# Patient Record
Sex: FEMALE | Race: BLACK OR AFRICAN AMERICAN | NOT HISPANIC OR LATINO | Employment: FULL TIME | URBAN - METROPOLITAN AREA
[De-identification: names, ages, dates, MRNs, and addresses within clinical notes are randomized per-mention and may not be internally consistent; named-entity substitution may affect disease eponyms.]

---

## 2017-03-02 ENCOUNTER — OFFICE VISIT (OUTPATIENT)
Dept: INTERNAL MEDICINE | Facility: CLINIC | Age: 27
End: 2017-03-02
Payer: COMMERCIAL

## 2017-03-02 VITALS
HEART RATE: 77 BPM | WEIGHT: 181.44 LBS | DIASTOLIC BLOOD PRESSURE: 66 MMHG | SYSTOLIC BLOOD PRESSURE: 120 MMHG | OXYGEN SATURATION: 99 % | RESPIRATION RATE: 16 BRPM | HEIGHT: 61 IN | BODY MASS INDEX: 34.26 KG/M2 | TEMPERATURE: 97 F

## 2017-03-02 DIAGNOSIS — J30.2 SEASONAL ALLERGIC RHINITIS, UNSPECIFIED ALLERGIC RHINITIS TRIGGER: ICD-10-CM

## 2017-03-02 DIAGNOSIS — E66.9 OBESITY (BMI 30.0-34.9): ICD-10-CM

## 2017-03-02 DIAGNOSIS — Z00.00 ROUTINE HEALTH MAINTENANCE: ICD-10-CM

## 2017-03-02 DIAGNOSIS — Z11.3 SCREEN FOR STD (SEXUALLY TRANSMITTED DISEASE): ICD-10-CM

## 2017-03-02 DIAGNOSIS — J40 BRONCHITIS: Primary | ICD-10-CM

## 2017-03-02 PROBLEM — J30.9 ALLERGIC RHINITIS: Status: ACTIVE | Noted: 2017-03-02

## 2017-03-02 PROCEDURE — 1160F RVW MEDS BY RX/DR IN RCRD: CPT | Mod: S$GLB,,, | Performed by: FAMILY MEDICINE

## 2017-03-02 PROCEDURE — 99999 PR PBB SHADOW E&M-NEW PATIENT-LVL III: CPT | Mod: PBBFAC,,, | Performed by: FAMILY MEDICINE

## 2017-03-02 PROCEDURE — 99203 OFFICE O/P NEW LOW 30 MIN: CPT | Mod: S$GLB,,, | Performed by: FAMILY MEDICINE

## 2017-03-02 RX ORDER — METHYLPREDNISOLONE 4 MG/1
TABLET ORAL
Qty: 1 PACKAGE | Refills: 0 | Status: SHIPPED | OUTPATIENT
Start: 2017-03-02 | End: 2017-03-23

## 2017-03-02 RX ORDER — LORATADINE 10 MG/1
TABLET ORAL
Refills: 0 | COMMUNITY
Start: 2017-02-13 | End: 2017-09-27

## 2017-03-02 RX ORDER — FLUTICASONE PROPIONATE AND SALMETEROL 100; 50 UG/1; UG/1
1 POWDER RESPIRATORY (INHALATION) 2 TIMES DAILY
COMMUNITY
End: 2017-03-02

## 2017-03-02 NOTE — MEDICAL/APP STUDENT
Subjective:       Patient ID: Ghada Oreilly is a 27 y.o. female.    Chief Complaint: Cough (times 3 weeks ) and Wheezing    HPI     26 yo AAF presenting alone for persistent cough.  She reports cough x ~ 3 weeks.  At times, she reports the cough is productive of green/white phlegm.  She reports chest pain with cough and some wheezing.  Her symptoms do seem to worsen at night when lying down.  She has been taking an old advair inhaler with no relief.  She denies n/v/d, runny nose, and fever.  Associated symptoms include intermittent headache. She denies trying OTC cough medications.  She is also looking to establish care with [facility] - previously she was treated by Dr. Aburto.      Health Maintenance:  Denies previous lipid screening.   Pap smear: 1/2017      Family History   Problem Relation Age of Onset    Hypertension Mother     Diabetes Mother     Thyroid disease Mother     No Known Problems Father        Current Outpatient Prescriptions:     fluticasone-salmeterol 100-50 mcg/dose (ADVAIR) 100-50 mcg/dose diskus inhaler, Inhale 1 puff into the lungs 2 (two) times daily. Controller, Disp: , Rfl:     loratadine (CLARITIN) 10 mg tablet, TAKE 1 TABLET ORAL QD (EVERY DAY) FOR 14 DAYS, Disp: , Rfl: 0    Review of Systems   Constitutional: Negative for fever.   HENT: Positive for sneezing. Negative for ear pain, rhinorrhea and sore throat.    Eyes: Positive for discharge.   Respiratory: Positive for cough and shortness of breath. Negative for chest tightness.    Cardiovascular: Negative for chest pain.   Gastrointestinal: Negative for abdominal pain, diarrhea, nausea and vomiting.   Genitourinary: Negative for difficulty urinating.   Skin: Negative for rash.   Allergic/Immunologic: Positive for environmental allergies.   Neurological: Positive for headaches.       Objective:   /66 (BP Location: Left arm, Patient Position: Sitting, BP Method: Automatic)  Pulse 77  Temp 97.2 °F (36.2 °C) (Tympanic)    "Resp 16  Ht 5' 1" (1.549 m)  Wt 82.3 kg (181 lb 7 oz)  LMP 02/15/2017  SpO2 99%  BMI 34.28 kg/m2     Physical Exam   Constitutional: She is oriented to person, place, and time. She appears well-developed and well-nourished. No distress.   HENT:   Head: Normocephalic and atraumatic.   Nose: Mucosal edema present.   Mouth/Throat: Posterior oropharyngeal erythema present. No oropharyngeal exudate.   Bilateral ear canals erythematous   Eyes: Conjunctivae are normal. Pupils are equal, round, and reactive to light. Right eye exhibits no discharge. Left eye exhibits no discharge.   Neck: Neck supple. No tracheal deviation present. No thyromegaly present.   Cardiovascular: Normal rate, regular rhythm and normal heart sounds.    No murmur heard.  Pulmonary/Chest: Effort normal and breath sounds normal. No respiratory distress. She has no wheezes.   Abdominal: Soft. Bowel sounds are normal. There is no tenderness.   Musculoskeletal: Normal range of motion. She exhibits no edema.   Lymphadenopathy:     She has no cervical adenopathy.   Neurological: She is alert and oriented to person, place, and time.   Skin: Skin is warm and dry. She is not diaphoretic.   Psychiatric: She has a normal mood and affect. Her behavior is normal. Judgment and thought content normal.       Assessment & Plan     Bronchitis:  - Start taking Claritin daily  - Discontinue Advair inhaler  - Start Medrol dose pack  - If symptoms are recurrent or no improvement, noted will consider additional testing including pulmonary testing to r/o asthma     Health Maintenance:  - Lipid panel, CBC, CMP, and HIV today  - U/A including gonorrhea/chlamydia screening        "

## 2017-03-02 NOTE — PROGRESS NOTES
"Subjective:       Patient ID: Ghada Oreilly is a 27 y.o. female.    Chief Complaint: Cough (times 3 weeks ) and Wheezing    HPI  Here today to establish care but she has also been having coughing for the last couple weeks with associated congestion.  It has been getting worse over the last few days with wheezing especially when she coughs.  She has no history of asthma but was given Advair in the past to help with similar symptoms.  She reports that she has episodes about 3 times a year where she will have coughing with wheezing.  She has always had a history of ALLERGIES even as a child.  She takes Claritin but not on a daily basis.  Has not taken any Flonase.  She recently had a Pap smear in January and it was normal.  She does not want the flu shot.  Has not had routine blood work in some time.    Family History   Problem Relation Age of Onset    Hypertension Mother     Diabetes Mother     Thyroid disease Mother     No Known Problems Father        Current Outpatient Prescriptions:     loratadine (CLARITIN) 10 mg tablet, TAKE 1 TABLET ORAL QD (EVERY DAY) FOR 14 DAYS, Disp: , Rfl: 0    methylPREDNISolone (MEDROL DOSEPACK) 4 mg tablet, use as directed, Disp: 1 Package, Rfl: 0    Review of Systems   Constitutional: Negative for chills and fever.   HENT: Positive for congestion. Negative for sore throat.    Eyes: Negative for visual disturbance.   Respiratory: Positive for cough and wheezing. Negative for shortness of breath.    Cardiovascular: Negative for chest pain.   Gastrointestinal: Negative for abdominal pain, constipation and diarrhea.   Genitourinary: Negative for difficulty urinating and menstrual problem.   Skin: Negative for rash.   Neurological: Negative for dizziness.       Objective:   /66 (BP Location: Left arm, Patient Position: Sitting, BP Method: Automatic)  Pulse 77  Temp 97.2 °F (36.2 °C) (Tympanic)   Resp 16  Ht 5' 1" (1.549 m)  Wt 82.3 kg (181 lb 7 oz)  LMP 02/15/2017  " SpO2 99%  BMI 34.28 kg/m2     Physical Exam   Constitutional: She is oriented to person, place, and time. She appears well-developed and well-nourished. No distress.   HENT:   Head: Normocephalic and atraumatic.   Nose: Nose normal.   Eyes: Conjunctivae and EOM are normal. Pupils are equal, round, and reactive to light. Right eye exhibits no discharge. Left eye exhibits no discharge.   Neck: No thyromegaly present.   Cardiovascular: Normal rate and regular rhythm.    No murmur heard.  Pulmonary/Chest: Effort normal. No respiratory distress. She has wheezes. She has no rales.   Abdominal: Soft. She exhibits no distension.   Musculoskeletal: She exhibits no edema.   Neurological: She is alert and oriented to person, place, and time.   Skin: No rash noted. She is not diaphoretic.   Psychiatric: She has a normal mood and affect. Her behavior is normal.   Vitals reviewed.      Assessment & Plan     1. Bronchitis  Signs and symptoms are consistent with bronchitis.  We'll give her Medrol Dosepak and advised her to take Claritin and Flonase on a daily basis.  Recommended for her to follow-up if she continues to have issues with wheezing especially after this acute episode.  Would consider PFTs to be sure she does not have an asthma diagnosis but it certainly seems more like a seasonal issue    2. Routine health maintenance  He is up-to-date with HPV vaccines and had her recent Pap smear.  We'll get routine lab work with lipid panel and STD screening.    3. Seasonal allergic rhinitis, unspecified allergic rhinitis trigger  Continue Claritin on a daily basis with Flonase for flareups.    4. Obesity (BMI 30.0-34.9)  Getting lipid panel.  - Lipid panel; Future    5. Screen for STD (sexually transmitted disease)  - HIV-1 and HIV-2 antibodies; Future  - RPR; Future

## 2017-03-02 NOTE — PATIENT INSTRUCTIONS
Bronchitis with Wheezing (Viral or Bacterial: Adult)    Bronchitis is an infection of the air passages. It often occurs during a cold and is usually caused by a virus. Symptoms include cough with mucus (phlegm) and low-grade fever. This illness is contagious during the first few days and is spread through the air by coughing and sneezing, or by direct contact (touching the sick person and then touching your own eyes, nose, or mouth).  If there is a lot of inflammation, air flow is restricted. The air passages may also go into spasm, especially if you have asthma. This causes wheezing and difficulty breathing even in people who do not have asthma.  Bronchitis usually lasts 7 to 14 days. The wheezing should improve with treatment during the first week. An inhaler is often prescribed to relax the air passages and stop wheezing. Antibiotics will be prescribed if your doctor thinks there is also a secondary bacterial infection.  Home care  · If symptoms are severe, rest at home for the first 2 to 3 days. When you go back to your usual activities, don't let yourself get too tired.  · Do not smoke. Also avoid being exposed to secondhand smoke.  · You may use over-the-counter medicine to control fever or pain, unless another medicine was prescribed. Note: If you have chronic liver or kidney disease or have ever had a stomach ulcer or gastrointestinal bleeding, talk with your healthcare provider before using these medicines. Also talk to your provider if you are taking medicine to prevent blood clots.) Aspirin should never be given to anyone younger than 18 years of age who is ill with a viral infection or fever. It may cause severe liver or brain damage.  · Your appetite may be poor, so a light diet is fine. Avoid dehydration by drinking 6 to 8 glasses of fluids per day (such as water, soft drinks, sports drinks, juices, tea, or soup). Extra fluids will help loosen secretions in the nose and lungs.  · Over-the-counter  cough, cold, and sore-throat medicines will not shorten the length of the illness, but they may be helpful to reduce symptoms. (Note: Do not use decongestants if you have high blood pressure.)  · If you were given an inhaler, use it exactly as directed. If you need to use it more often than prescribed, your condition may be worsening. If this happens, contact your healthcare provider.  · If prescribed, finish all antibiotic medicine, even if you are feeling better after only a few days.  Follow-up care  Follow up with your healthcare provider, or as advised. If you had an X-ray or ECG (electrocardiogram), a specialist will review it. You will be notified of any new findings that may affect your care.  Note: If you are age 65 or older, or if you have a chronic lung disease or condition that affects your immune system, or you smoke, talk to your healthcare provider about having a pneumococcal vaccinations and a yearly influenza vaccination (flu shot).  When to seek medical advice  Call your healthcare provider right away if any of these occur:  · Fever of 100.4°F (38°C) or higher  · Coughing up increasing amounts of colored sputum  · Weakness, drowsiness, headache, facial pain, ear pain, or a stiff neck  Call 911, or get immediate medical care  Contact emergency services right away if any of these occur.  · Coughing up blood  · Worsening weakness, drowsiness, headache, or stiff neck  · Increased wheezing not helped with medication, shortness of breath, or pain with breathing  Date Last Reviewed: 9/13/2015  © 9423-6070 [employer]. [address] [place] [address], [place] [address]. All rights reserved. This information is not intended as a substitute for professional medical care. Always follow your healthcare professional's instructions.

## 2017-03-02 NOTE — MR AVS SNAPSHOT
[facility] - [facility]  [id number] [address] [address]  Phone: [phone]                  Ghada Oreilly   3/2/2017 10:00 AM   Office Visit    Description:  Female : 1990   Provider:  Vishal Grace DO   Department:  [facility]           Reason for Visit     Cough     Wheezing           Diagnoses this Visit        Comments    Bronchitis    -  Primary     Routine health maintenance         Seasonal allergic rhinitis, unspecified allergic rhinitis trigger         Obesity (BMI 30.0-34.9)         Screen for STD (sexually transmitted disease)                To Do List           Future Appointments        Provider Department Dept Phone    3/9/2017 10:00 AM [facility] LABORATORY [facility] [phone]      Goals (5 Years of Data)     None       These Medications        Disp Refills Start End    methylPREDNISolone (MEDROL DOSEPACK) 4 mg tablet 1 Package 0 3/2/2017 3/23/2017    use as directed    Pharmacy: [facility] [address] [place] [address] [id number] [place] [address] Ph #: [phone]         [facility]     [facility] -  Assistance  Registered nurses in the [facility] provide clinical advisement, health education, appointment booking, and other advisory services.  Call for this free service at [phone].             Medications           Message regarding Medications     Verify the changes and/or additions to your medication regime listed below are the same as discussed with your clinician today.  If any of these changes or additions are incorrect, please notify your healthcare provider.        START taking these NEW medications        Refills    methylPREDNISolone (MEDROL DOSEPACK) 4 mg tablet 0    Sig: use as directed    Class: Normal      STOP taking these medications     fluticasone-salmeterol 100-50 mcg/dose (ADVAIR) 100-50 mcg/dose diskus inhaler Inhale 1 puff into the lungs 2 (two) times  daily. Controller           Verify that the below list of medications is an accurate representation of the medications you are currently taking.  If none reported, the list may be blank. If incorrect, please contact your healthcare provider. Carry this list with you in case of emergency.           Current Medications     loratadine (CLARITIN) 10 mg tablet TAKE 1 TABLET ORAL QD (EVERY DAY) FOR 14 DAYS    methylPREDNISolone (MEDROL DOSEPACK) 4 mg tablet use as directed           Clinical Reference Information           Your Vitals Were     BP                   120/66 (BP Location: Left arm, Patient Position: Sitting, BP Method: Automatic)           Blood Pressure          Most Recent Value    BP  120/66      Allergies as of 3/2/2017     No Known Allergies      Immunizations Administered on Date of Encounter - 3/2/2017     None      Orders Placed During Today's Visit     Future Labs/Procedures Expected by Expires    HIV-1 and HIV-2 antibodies  3/2/2017 5/1/2018    Lipid panel  3/2/2017 (Approximate) 5/1/2018    RPR  3/2/2017 5/1/2018      Instructions      Bronchitis with Wheezing (Viral or Bacterial: Adult)    Bronchitis is an infection of the air passages. It often occurs during a cold and is usually caused by a virus. Symptoms include cough with mucus (phlegm) and low-grade fever. This illness is contagious during the first few days and is spread through the air by coughing and sneezing, or by direct contact (touching the sick person and then touching your own eyes, nose, or mouth).  If there is a lot of inflammation, air flow is restricted. The air passages may also go into spasm, especially if you have asthma. This causes wheezing and difficulty breathing even in people who do not have asthma.  Bronchitis usually lasts 7 to 14 days. The wheezing should improve with treatment during the first week. An inhaler is often prescribed to relax the air passages and stop wheezing. Antibiotics will be prescribed if your  doctor thinks there is also a secondary bacterial infection.  Home care  · If symptoms are severe, rest at home for the first 2 to 3 days. When you go back to your usual activities, don't let yourself get too tired.  · Do not smoke. Also avoid being exposed to secondhand smoke.  · You may use over-the-counter medicine to control fever or pain, unless another medicine was prescribed. Note: If you have chronic liver or kidney disease or have ever had a stomach ulcer or gastrointestinal bleeding, talk with your healthcare provider before using these medicines. Also talk to your provider if you are taking medicine to prevent blood clots.) Aspirin should never be given to anyone younger than 18 years of age who is ill with a viral infection or fever. It may cause severe liver or brain damage.  · Your appetite may be poor, so a light diet is fine. Avoid dehydration by drinking 6 to 8 glasses of fluids per day (such as water, soft drinks, sports drinks, juices, tea, or soup). Extra fluids will help loosen secretions in the nose and lungs.  · Over-the-counter cough, cold, and sore-throat medicines will not shorten the length of the illness, but they may be helpful to reduce symptoms. (Note: Do not use decongestants if you have high blood pressure.)  · If you were given an inhaler, use it exactly as directed. If you need to use it more often than prescribed, your condition may be worsening. If this happens, contact your healthcare provider.  · If prescribed, finish all antibiotic medicine, even if you are feeling better after only a few days.  Follow-up care  Follow up with your healthcare provider, or as advised. If you had an X-ray or ECG (electrocardiogram), a specialist will review it. You will be notified of any new findings that may affect your care.  Note: If you are age 65 or older, or if you have a chronic lung disease or condition that affects your immune system, or you smoke, talk to your healthcare provider about  having a pneumococcal vaccinations and a yearly influenza vaccination (flu shot).  When to seek medical advice  Call your healthcare provider right away if any of these occur:  · Fever of 100.4°F (38°C) or higher  · Coughing up increasing amounts of colored sputum  · Weakness, drowsiness, headache, facial pain, ear pain, or a stiff neck  Call 911, or get immediate medical care  Contact emergency services right away if any of these occur.  · Coughing up blood  · Worsening weakness, drowsiness, headache, or stiff neck  · Increased wheezing not helped with medication, shortness of breath, or pain with breathing  Date Last Reviewed: 9/13/2015 © 2000-2016 [employer]. [address] [place] [address], [place] [address]. All rights reserved. This information is not intended as a substitute for professional medical care. Always follow your healthcare professional's instructions.             Language Assistance Services     ATTENTION: Language assistance services are available, free of charge. Please call [phone].      ATENCIÓN: Si habla español, tiene a vogt disposición servicios gratuitos de asistencia lingüística. Llame al [phone].     CHÚ Ý: N?u b?n nói Ti?ng Vi?t, có các d?ch v? h? tr? ngôn ng? mi?n phí dành cho b?n. G?i s? [phone].         [facility] complies with applicable Federal civil rights laws and does not discriminate on the basis of race, color, national origin, age, disability, or sex.

## 2017-04-03 ENCOUNTER — TELEPHONE (OUTPATIENT)
Dept: INTERNAL MEDICINE | Facility: CLINIC | Age: 27
End: 2017-04-03

## 2017-05-29 ENCOUNTER — OFFICE VISIT (OUTPATIENT)
Dept: INTERNAL MEDICINE | Facility: CLINIC | Age: 27
End: 2017-05-29
Payer: COMMERCIAL

## 2017-05-29 ENCOUNTER — LAB VISIT (OUTPATIENT)
Dept: LAB | Facility: HOSPITAL | Age: 27
End: 2017-05-29
Attending: FAMILY MEDICINE
Payer: COMMERCIAL

## 2017-05-29 VITALS
WEIGHT: 184.75 LBS | HEIGHT: 60 IN | SYSTOLIC BLOOD PRESSURE: 111 MMHG | DIASTOLIC BLOOD PRESSURE: 72 MMHG | TEMPERATURE: 98 F | BODY MASS INDEX: 36.27 KG/M2 | RESPIRATION RATE: 18 BRPM | HEART RATE: 80 BPM | OXYGEN SATURATION: 98 %

## 2017-05-29 DIAGNOSIS — L23.2 ALLERGIC CONTACT DERMATITIS DUE TO COSMETICS: Primary | ICD-10-CM

## 2017-05-29 DIAGNOSIS — E66.9 OBESITY (BMI 30.0-34.9): ICD-10-CM

## 2017-05-29 DIAGNOSIS — Z11.3 SCREEN FOR STD (SEXUALLY TRANSMITTED DISEASE): ICD-10-CM

## 2017-05-29 LAB
CHOLEST/HDLC SERPL: 5.1 {RATIO}
HDL/CHOLESTEROL RATIO: 19.5 %
HDLC SERPL-MCNC: 190 MG/DL
HDLC SERPL-MCNC: 37 MG/DL
LDLC SERPL CALC-MCNC: 132.4 MG/DL
NONHDLC SERPL-MCNC: 153 MG/DL
TRIGL SERPL-MCNC: 103 MG/DL

## 2017-05-29 PROCEDURE — 36415 COLL VENOUS BLD VENIPUNCTURE: CPT | Mod: PO

## 2017-05-29 PROCEDURE — 86592 SYPHILIS TEST NON-TREP QUAL: CPT

## 2017-05-29 PROCEDURE — 80061 LIPID PANEL: CPT

## 2017-05-29 PROCEDURE — 86703 HIV-1/HIV-2 1 RESULT ANTBDY: CPT

## 2017-05-29 PROCEDURE — 99999 PR PBB SHADOW E&M-EST. PATIENT-LVL III: CPT | Mod: PBBFAC,,, | Performed by: FAMILY MEDICINE

## 2017-05-29 PROCEDURE — 99213 OFFICE O/P EST LOW 20 MIN: CPT | Mod: S$GLB,,, | Performed by: FAMILY MEDICINE

## 2017-05-29 NOTE — PATIENT INSTRUCTIONS
"  Contact Dermatitis  Contact dermatitis is a skin rash caused by something that touches the skin and makes it irritated and inflamed. Your skin may be red, swollen, dry, and may be cracked. Blisters may form and ooze. The rash will itch.  Contact dermatitis can form on the face and neck, backs of hands, forearms, genitals, and lower legs.  People can get contact dermatitis from lots of sources. These include:  · Plants such as poison ivy, oak, or sumac  · Chemicals in hair dyes and rinses, soaps, solvents, waxes, fingernail polish, and deodorants   · Jewelry or watchbands made of nickel  Contact dermatitis is not passed from person to person.  Talk with your healthcare provider about what may have caused the rash. A type of allergy testing called "patch testing" may be used to discover what you are allergic to. You will need to avoid the source of your rash in the future to prevent it from coming back.  Treatment is done to relieve itching and prevent the rash from coming back. The rash should go away in a few days to a few weeks.  Home care  Your healthcare provider may prescribe medicine to relieve swelling and itching. Follow all instructions when using these medicines.  General care:  · Avoid anything that heats up your skin, such as hot showers or baths, or direct sunlight. This can make itching worse.  · Apply cold compresses to soothe your sores to help relieve your symptoms. Do this for 30 minutes 3 to 4 times a day. You can make a cold compress by soaking a cloth in cold water. Squeeze out excess water. You can add colloidal oatmeal to the water to help reduce itching. For severe itching in a small area, apply an ice pack wrapped in a thin towel. Do this for 20 minutes 3 to 4 times a day.  · You can also try wet dressings. One way to do this is to wear a wet piece of clothing under a dry one. Wear a damp shirt under a dry shirt if your upper body is affected. This can relieve itching and prevent you from " scratching the affected area.  · You can also help relieve large areas of itching by taking a lukewarm bath with colloidal oatmeal added to the water.  · Use hydrocortisone cream for redness and irritation, unless another medicine was prescribed. You can also use benzocaine anesthetic cream or spray. Calamine lotion can also relieve mild symptoms.  · Use oral diphenhydramine to help reduce itching. You can buy this antihistamine at drug and grocery stores. It can make you sleepy, so use lower doses during the daytime. Or you can use loratadine. This is an antihistamine that will not make you sleepy. Do not use diphenhydramine if you have glaucoma or have trouble urinating due to an enlarged prostate.  · If a plant causes your rash, make sure to wash your skin and the clothes you were wearing when you came into contact with the plant. This is to wash away the plant oils that gave you the rash and prevent more or worse symptoms.  · Stay away from the substance or object that causes your symptoms. If you cant avoid it, wear gloves or some other type of protection.  Follow-up care  Follow up with your healthcare provider, or as advised.  When to seek medical advice  Call your healthcare provider right away if any of these occur:  · Spreading of the rash to other parts of your body  · Severe swelling of your face, eyelids, mouth, throat or tongue  · Trouble urinating due to swelling in the genital area  · Fever of 100.4°F (38°C) or higher  · Redness or swelling that gets worse  · Pain that gets worse  · Foul-smelling fluid leaking from the skin  · Yellow-brown crusts on the open blisters  Date Last Reviewed: 9/1/2016  © 1723-8313 [employer]. [address] [place] [address], [place] [address]. All rights reserved. This information is not intended as a substitute for professional medical care. Always follow your healthcare professional's instructions.

## 2017-05-30 LAB
HIV 1+2 AB+HIV1 P24 AG SERPL QL IA: NEGATIVE
RPR SER QL: NORMAL

## 2017-06-01 ENCOUNTER — TELEPHONE (OUTPATIENT)
Dept: INTERNAL MEDICINE | Facility: CLINIC | Age: 27
End: 2017-06-01

## 2017-06-01 NOTE — TELEPHONE ENCOUNTER
----- Message from YAZAN Bernstein,FOXP-C sent at 5/30/2017  1:41 PM CDT -----  Pt of dr weeks  STD testing negative  Cholesterol mostly ok, but good cholesterol needs to improve. Do this with exercise and increased intake of healthy fats including olive oil, coconut oil, fatty fish such as salmon.

## 2017-06-21 ENCOUNTER — NURSE TRIAGE (OUTPATIENT)
Dept: ADMINISTRATIVE | Facility: CLINIC | Age: 27
End: 2017-06-21

## 2017-06-21 NOTE — TELEPHONE ENCOUNTER
Reason for Disposition   Caller requesting a NON-URGENT new prescription or refill and triager unable to refill per unit policy    Protocols used: ST MEDICATION QUESTION CALL-A-FABIO Urrutia states she would like a refill of the loratadine 10 mg (Claritin) that she was taking in March. There were no refills on that order.  Message to Vishal Grace DO .  Please contact caller directly with any additional care advice.

## 2017-09-27 ENCOUNTER — OFFICE VISIT (OUTPATIENT)
Dept: INTERNAL MEDICINE | Facility: CLINIC | Age: 27
End: 2017-09-27
Payer: COMMERCIAL

## 2017-09-27 VITALS
BODY MASS INDEX: 36.36 KG/M2 | SYSTOLIC BLOOD PRESSURE: 127 MMHG | HEART RATE: 86 BPM | OXYGEN SATURATION: 98 % | TEMPERATURE: 98 F | WEIGHT: 185.19 LBS | HEIGHT: 60 IN | RESPIRATION RATE: 14 BRPM | DIASTOLIC BLOOD PRESSURE: 67 MMHG

## 2017-09-27 DIAGNOSIS — L23.2 ALLERGIC CONTACT DERMATITIS DUE TO COSMETICS: Primary | ICD-10-CM

## 2017-09-27 PROCEDURE — 3008F BODY MASS INDEX DOCD: CPT | Mod: S$GLB,,, | Performed by: FAMILY MEDICINE

## 2017-09-27 PROCEDURE — 99999 PR PBB SHADOW E&M-EST. PATIENT-LVL III: CPT | Mod: PBBFAC,,, | Performed by: FAMILY MEDICINE

## 2017-09-27 PROCEDURE — 99213 OFFICE O/P EST LOW 20 MIN: CPT | Mod: S$GLB,,, | Performed by: FAMILY MEDICINE

## 2017-09-27 NOTE — PROGRESS NOTES
Subjective:       Patient ID: Ghada Oreilly is a 27 y.o. female.    Chief Complaint: Rash (liza armpits)    HPI  Her to follow-up on rash axilla.  This has been bothering her on and off for several months now.  Since the last visit she has not tried any Zyrtec to help with general irritation nor has she used hydrocortisone cream.  Recently she is been using hydrogen peroxide and alcohol topically.  She has been using Dove deodorant but is also using Bath and body Works lotions which are scented.  Says that she has itching all over her body.  The area that she has irritation however is under her arms.  There are no areas of drainage and no intense pain but significant itching almost constantly.    Family History   Problem Relation Age of Onset    Hypertension Mother     Diabetes Mother     Thyroid disease Mother     No Known Problems Father      No current outpatient prescriptions on file.    Review of Systems   Constitutional: Negative for chills and fever.   Respiratory: Negative for cough, chest tightness and shortness of breath.    Cardiovascular: Negative for chest pain.   Gastrointestinal: Negative for abdominal pain.   Musculoskeletal: Positive for arthralgias (recommended that she follow-up with orthopedics for ankle).   Skin: Negative for rash.   Neurological: Negative for dizziness.       Objective:   /67 (BP Location: Left arm, Patient Position: Sitting, BP Method: Large (Automatic))   Pulse 86   Temp 97.6 °F (36.4 °C) (Tympanic)   Resp 14   Ht 5' (1.524 m)   Wt 84 kg (185 lb 3 oz)   LMP 09/20/2017   SpO2 98%   BMI 36.17 kg/m²      Physical Exam   Constitutional: She is oriented to person, place, and time. She appears well-developed and well-nourished.   HENT:   Head: Normocephalic and atraumatic.   Eyes: Conjunctivae are normal.   Cardiovascular: Normal rate.    Pulmonary/Chest: Effort normal. No respiratory distress.   Musculoskeletal: She exhibits no edema.   Neurological: She is  alert and oriented to person, place, and time. Coordination normal.   Skin: Skin is warm and dry. No rash noted.   Continues to have raised area of plaque like irritation under both of her arms.  The left axilla is worse with some areas of excoriation.  No signs of abscess or hidradenitis.   Psychiatric: She has a normal mood and affect. Her behavior is normal.   Vitals reviewed.      Assessment & Plan     1. Allergic contact dermatitis due to cosmetics  Discussed various ways to improve this condition.  #1 is avoiding use of alcohol and hydrogen peroxide topically.  Recommended using hydrocortisone cream topically once this has improved she can return to using deodorant and shaving.  Recommended Zyrtec for general itching and also told her to consider using Aveeno, baby oil or Eucerin as hypoallergenic lotions.    Recommended flu shot but she refused today.   Will follow up with gyn at [facility] for pap

## 2017-09-27 NOTE — PATIENT INSTRUCTIONS
Recommend avoiding use of bath and body works or other scented lotions. Preferable to use aveeno or other lotion after showering such as eucerin. May also use baby oil and after drying from shower, apply hydrocortisone cream to affected area under arms. Recommend cetaphil soap to clean underarms.   Zyrtec (cetirizine) will help with overall itching.   Trim nails to avoid further irritation.  Avoid topical alcohol and hydrogen peroxide

## 2017-12-04 ENCOUNTER — HOSPITAL ENCOUNTER (EMERGENCY)
Facility: HOSPITAL | Age: 27
Discharge: HOME | End: 2017-12-04
Attending: EMERGENCY MEDICINE
Payer: COMMERCIAL

## 2017-12-04 ENCOUNTER — TELEPHONE (OUTPATIENT)
Dept: INTERNAL MEDICINE | Facility: CLINIC | Age: 27
End: 2017-12-04

## 2017-12-04 VITALS
SYSTOLIC BLOOD PRESSURE: 124 MMHG | DIASTOLIC BLOOD PRESSURE: 79 MMHG | TEMPERATURE: 99 F | HEART RATE: 89 BPM | WEIGHT: 185 LBS | BODY MASS INDEX: 36.13 KG/M2 | OXYGEN SATURATION: 99 % | RESPIRATION RATE: 20 BRPM

## 2017-12-04 DIAGNOSIS — N30.01 ACUTE CYSTITIS WITH HEMATURIA: ICD-10-CM

## 2017-12-04 DIAGNOSIS — R10.9 LEFT FLANK PAIN: Primary | ICD-10-CM

## 2017-12-04 LAB
ALBUMIN SERPL BCP-MCNC: 3.7 G/DL
ALP SERPL-CCNC: 66 U/L
ALT SERPL W/O P-5'-P-CCNC: 9 U/L
ANION GAP SERPL CALC-SCNC: 10 MMOL/L
AST SERPL-CCNC: 13 U/L
B-HCG UR QL: NEGATIVE
BACTERIA #/AREA URNS AUTO: ABNORMAL /HPF
BACTERIA #/AREA URNS AUTO: ABNORMAL /HPF
BASOPHILS # BLD AUTO: 0.03 K/UL
BASOPHILS NFR BLD: 0.4 %
BILIRUB SERPL-MCNC: 0.3 MG/DL
BILIRUB UR QL STRIP: ABNORMAL
BILIRUB UR QL STRIP: NEGATIVE
BUN SERPL-MCNC: 11 MG/DL
CALCIUM SERPL-MCNC: 9.4 MG/DL
CHLORIDE SERPL-SCNC: 106 MMOL/L
CLARITY UR REFRACT.AUTO: ABNORMAL
CLARITY UR REFRACT.AUTO: CLEAR
CO2 SERPL-SCNC: 23 MMOL/L
COLOR UR AUTO: ABNORMAL
COLOR UR AUTO: YELLOW
CREAT SERPL-MCNC: 0.8 MG/DL
DIFFERENTIAL METHOD: ABNORMAL
EOSINOPHIL # BLD AUTO: 0.4 K/UL
EOSINOPHIL NFR BLD: 5.4 %
ERYTHROCYTE [DISTWIDTH] IN BLOOD BY AUTOMATED COUNT: 14 %
EST. GFR  (AFRICAN AMERICAN): >60 ML/MIN/1.73 M^2
EST. GFR  (NON AFRICAN AMERICAN): >60 ML/MIN/1.73 M^2
GLUCOSE SERPL-MCNC: 113 MG/DL
GLUCOSE UR QL STRIP: NEGATIVE
GLUCOSE UR QL STRIP: NEGATIVE
HCT VFR BLD AUTO: 38.9 %
HGB BLD-MCNC: 12.2 G/DL
HGB UR QL STRIP: ABNORMAL
HGB UR QL STRIP: ABNORMAL
HYALINE CASTS UR QL AUTO: 0 /LPF
HYALINE CASTS UR QL AUTO: 0 /LPF
KETONES UR QL STRIP: ABNORMAL
KETONES UR QL STRIP: ABNORMAL
LEUKOCYTE ESTERASE UR QL STRIP: ABNORMAL
LEUKOCYTE ESTERASE UR QL STRIP: ABNORMAL
LYMPHOCYTES # BLD AUTO: 2.3 K/UL
LYMPHOCYTES NFR BLD: 30.7 %
MCH RBC QN AUTO: 27.7 PG
MCHC RBC AUTO-ENTMCNC: 31.4 G/DL
MCV RBC AUTO: 88 FL
MICROSCOPIC COMMENT: ABNORMAL
MICROSCOPIC COMMENT: ABNORMAL
MONOCYTES # BLD AUTO: 0.5 K/UL
MONOCYTES NFR BLD: 6.1 %
NEUTROPHILS # BLD AUTO: 4.3 K/UL
NEUTROPHILS NFR BLD: 57.1 %
NITRITE UR QL STRIP: NEGATIVE
NITRITE UR QL STRIP: NEGATIVE
PH UR STRIP: 7 [PH] (ref 5–8)
PH UR STRIP: 7 [PH] (ref 5–8)
PLATELET # BLD AUTO: 190 K/UL
PMV BLD AUTO: 12.1 FL
POTASSIUM SERPL-SCNC: 3.8 MMOL/L
PROT SERPL-MCNC: 7.1 G/DL
PROT UR QL STRIP: ABNORMAL
PROT UR QL STRIP: ABNORMAL
RBC # BLD AUTO: 4.41 M/UL
RBC #/AREA URNS AUTO: >100 /HPF (ref 0–4)
RBC #/AREA URNS AUTO: >100 /HPF (ref 0–4)
SODIUM SERPL-SCNC: 139 MMOL/L
SP GR UR STRIP: 1.02 (ref 1–1.03)
SP GR UR STRIP: 1.02 (ref 1–1.03)
SQUAMOUS #/AREA URNS AUTO: 0 /HPF
URN SPEC COLLECT METH UR: ABNORMAL
URN SPEC COLLECT METH UR: ABNORMAL
UROBILINOGEN UR STRIP-ACNC: ABNORMAL EU/DL
UROBILINOGEN UR STRIP-ACNC: ABNORMAL EU/DL
WBC # BLD AUTO: 7.53 K/UL
WBC #/AREA URNS AUTO: 0 /HPF (ref 0–5)
WBC #/AREA URNS AUTO: >100 /HPF (ref 0–5)
YEAST UR QL AUTO: ABNORMAL
YEAST UR QL AUTO: ABNORMAL

## 2017-12-04 PROCEDURE — 81000 URINALYSIS NONAUTO W/SCOPE: CPT

## 2017-12-04 PROCEDURE — 80053 COMPREHEN METABOLIC PANEL: CPT

## 2017-12-04 PROCEDURE — 85025 COMPLETE CBC W/AUTO DIFF WBC: CPT

## 2017-12-04 PROCEDURE — 81025 URINE PREGNANCY TEST: CPT

## 2017-12-04 PROCEDURE — 25000003 PHARM REV CODE 250: Performed by: EMERGENCY MEDICINE

## 2017-12-04 PROCEDURE — 87086 URINE CULTURE/COLONY COUNT: CPT

## 2017-12-04 PROCEDURE — 99284 EMERGENCY DEPT VISIT MOD MDM: CPT

## 2017-12-04 RX ORDER — CIPROFLOXACIN 500 MG/1
500 TABLET ORAL 2 TIMES DAILY
Qty: 20 TABLET | Refills: 0 | Status: SHIPPED | OUTPATIENT
Start: 2017-12-04 | End: 2017-12-14

## 2017-12-04 RX ORDER — KETOROLAC TROMETHAMINE 10 MG/1
10 TABLET, FILM COATED ORAL EVERY 6 HOURS PRN
Qty: 16 TABLET | Refills: 0 | OUTPATIENT
Start: 2017-12-04 | End: 2018-11-26

## 2017-12-04 RX ORDER — CIPROFLOXACIN 500 MG/1
500 TABLET ORAL
Status: COMPLETED | OUTPATIENT
Start: 2017-12-04 | End: 2017-12-04

## 2017-12-04 RX ADMIN — CIPROFLOXACIN 500 MG: 500 TABLET, FILM COATED ORAL at 03:12

## 2017-12-04 NOTE — ED NOTES
LOC: The patient is awake, alert and aware of environment with an appropriate affect, the patient is oriented x 3 and speaking appropriately.  APPEARANCE: Patient resting comfortably and in no acute distress, patient is clean and well groomed, patient's clothing is properly fastened.  HEENT: Brief WNL  SKIN: Brief WNL.   MUSCULOSKELETAL: Brief WNL  RESPIRATORY: Brief WNL  CARDIAC: Brief WNL  GASTRO: Brief WNL. Denies n/v/d   : Brief WNL. Pain with urination and foul odor. Denies pain at this time  Peripheral Vasc: Brief WNL  NEURO: Brief WNL  PSYCH: Brief WNL

## 2017-12-04 NOTE — TELEPHONE ENCOUNTER
----- Message from Fidelina Ridley DO sent at 12/4/2017  3:21 AM CST -----  I saw your very nice patient in the emergency room tonight.  She left sided flank pain.  She had blood in her urine.  She declined CT renal stone.  She requested outpatient ultrasound.  I placed order for outpatient ultrasound.  Could she be so kind to help follow-up on the ultrasound?  I appreciate her help.    Fidelina

## 2017-12-04 NOTE — ED PROVIDER NOTES
"Encounter Date: 12/4/2017       History     Chief Complaint   Patient presents with    Flank Pain     Pt states "a pain in my left side that woke me up". pt states pain was brief, currently denies pain.     CHIEF COMPLIANT: Flank Pain (Pt states "a pain in my left side that woke me up". pt states pain was brief, currently denies pain.)      12/4/2017, 1:19 AM     The history is provided by the patient and mother. Ghada Oreilly is a 27 y.o. female presenting to the ED for left sided flank pain.  Patient reports that started having stabbing pain to her left flank when she woke up to go the bathroom.  Patient had the urge to go urinate.  She reports that she might have blood in her urine.  Although she does have intermittent spotting with her current birth control regimen.  She notes that her urine was burning.  When she went to go the bathroom, she started shaking, and left side had gone numb.  Patient denies any urgency, or frequency.  She's had a nonproductive dry cough for the last 14 days.  It is worse, pain was rated 8 out of 10.  Lasted approximately 15 minutes.  It is now 5 out of 10.  There is no radiation.  Was associated with nausea, but no vomiting.  Unknown what makes it better, and what makes it worse.  Patient denies any fevers, chills, vomiting, vaginal discharge, diarrhea.    PCP: Vishal Grace DO  Specialist:             Review of patient's allergies indicates:  No Known Allergies  Past Medical History:   Diagnosis Date    Allergy      Past Surgical History:   Procedure Laterality Date    ANKLE SURGERY Right      Family History   Problem Relation Age of Onset    Hypertension Mother     Diabetes Mother     Thyroid disease Mother     No Known Problems Father      Social History   Substance Use Topics    Smoking status: Never Smoker    Smokeless tobacco: Never Used    Alcohol use No     Review of Systems   Constitutional: Negative for fever.   HENT: Negative for sore throat.  "   Respiratory: Positive for cough (Nonproductive). Negative for shortness of breath.    Cardiovascular: Negative for chest pain.   Gastrointestinal: Positive for nausea. Negative for abdominal distention, abdominal pain and vomiting.   Genitourinary: Positive for dysuria, flank pain (left flank pain), hematuria and menstrual problem. Negative for difficulty urinating and urgency.   Musculoskeletal: Negative for back pain.   Skin: Negative for rash.   Neurological: Negative for weakness.   Hematological: Does not bruise/bleed easily.       Physical Exam     Initial Vitals [12/04/17 0058]   BP Pulse Resp Temp SpO2   123/81 96 18 98.5 °F (36.9 °C) 98 %      MAP       95           Vitals:    12/04/17 0058 12/04/17 0328   BP: 123/81 124/79   Pulse: 96 89   Resp: 18 20   Temp: 98.5 °F (36.9 °C)    TempSrc: Oral    SpO2: 98% 99%   Weight: 83.9 kg (185 lb)        Physical Exam    Nursing note and vitals reviewed.  Constitutional: She appears well-developed and well-nourished.   HENT:   Head: Normocephalic and atraumatic.   Eyes: Conjunctivae and EOM are normal. Pupils are equal, round, and reactive to light.   Neck: Normal range of motion.   Cardiovascular: Normal rate, regular rhythm and normal heart sounds.   Pulmonary/Chest: Breath sounds normal. No respiratory distress.   Abdominal: Soft. Bowel sounds are normal. She exhibits no mass. There is no rebound and no guarding.   Genitourinary:   Genitourinary Comments: No CVA tenderness     Musculoskeletal: Normal range of motion.   Neurological: She is alert and oriented to person, place, and time. She has normal strength. No cranial nerve deficit.   Cranial nerves II-XII intact   Skin: Skin is warm and dry.   Psychiatric: She has a normal mood and affect. Her speech is normal and behavior is normal. Thought content normal.         ED Course   Procedures  Labs Reviewed   URINALYSIS - Abnormal; Notable for the following:        Result Value    Color, UA Brown (*)      Appearance, UA Cloudy (*)     Protein, UA 2+ (*)     Ketones, UA Trace (*)     Bilirubin (UA) 1+ (*)     Occult Blood UA 3+ (*)     Urobilinogen, UA 2.0-3.0 (*)     Leukocytes, UA Trace (*)     All other components within normal limits   URINALYSIS MICROSCOPIC - Abnormal; Notable for the following:     RBC, UA >100 (*)     Bacteria, UA Many (*)     All other components within normal limits   URINALYSIS - Abnormal; Notable for the following:     Protein, UA 2+ (*)     Ketones, UA Trace (*)     Occult Blood UA 3+ (*)     Urobilinogen, UA 4.0-6.0 (*)     Leukocytes, UA 1+ (*)     All other components within normal limits   CBC W/ AUTO DIFFERENTIAL - Abnormal; Notable for the following:     MCHC 31.4 (*)     All other components within normal limits   COMPREHENSIVE METABOLIC PANEL - Abnormal; Notable for the following:     Glucose 113 (*)     ALT 9 (*)     All other components within normal limits   URINALYSIS MICROSCOPIC - Abnormal; Notable for the following:     RBC, UA >100 (*)     WBC, UA >100 (*)     All other components within normal limits   CULTURE, URINE   CULTURE, URINE   PREGNANCY TEST, URINE RAPID         Results for orders placed or performed during the hospital encounter of 12/04/17   Urinalysis   Result Value Ref Range    Specimen UA Urine, Clean Catch     Color, UA Brown (A) Yellow, Straw, Samantha    Appearance, UA Cloudy (A) Clear    pH, UA 7.0 5.0 - 8.0    Specific Gravity, UA 1.025 1.005 - 1.030    Protein, UA 2+ (A) Negative    Glucose, UA Negative Negative    Ketones, UA Trace (A) Negative    Bilirubin (UA) 1+ (A) Negative    Occult Blood UA 3+ (A) Negative    Nitrite, UA Negative Negative    Urobilinogen, UA 2.0-3.0 (A) <2.0 EU/dL    Leukocytes, UA Trace (A) Negative   Pregnancy, urine rapid   Result Value Ref Range    Preg Test, Ur Negative    Urinalysis Microscopic   Result Value Ref Range    RBC, UA >100 (H) 0 - 4 /hpf    WBC, UA 0 0 - 5 /hpf    Bacteria, UA Many (A) None-Occ /hpf    Yeast, UA  None None    Hyaline Casts, UA 0 0-1/lpf /lpf    Microscopic Comment SEE COMMENT    Urinalysis Catheterized   Result Value Ref Range    Specimen UA Urine, Catheterized     Color, UA Yellow Yellow, Straw, Samantha    Appearance, UA Clear Clear    pH, UA 7.0 5.0 - 8.0    Specific Gravity, UA 1.025 1.005 - 1.030    Protein, UA 2+ (A) Negative    Glucose, UA Negative Negative    Ketones, UA Trace (A) Negative    Bilirubin (UA) Negative Negative    Occult Blood UA 3+ (A) Negative    Nitrite, UA Negative Negative    Urobilinogen, UA 4.0-6.0 (A) <2.0 EU/dL    Leukocytes, UA 1+ (A) Negative   CBC auto differential   Result Value Ref Range    WBC 7.53 3.90 - 12.70 K/uL    RBC 4.41 4.00 - 5.40 M/uL    Hemoglobin 12.2 12.0 - 16.0 g/dL    Hematocrit 38.9 37.0 - 48.5 %    MCV 88 82 - 98 fL    MCH 27.7 27.0 - 31.0 pg    MCHC 31.4 (L) 32.0 - 36.0 g/dL    RDW 14.0 11.5 - 14.5 %    Platelets 190 150 - 350 K/uL    MPV 12.1 9.2 - 12.9 fL    Gran # 4.3 1.8 - 7.7 K/uL    Lymph # 2.3 1.0 - 4.8 K/uL    Mono # 0.5 0.3 - 1.0 K/uL    Eos # 0.4 0.0 - 0.5 K/uL    Baso # 0.03 0.00 - 0.20 K/uL    Gran% 57.1 38.0 - 73.0 %    Lymph% 30.7 18.0 - 48.0 %    Mono% 6.1 4.0 - 15.0 %    Eosinophil% 5.4 0.0 - 8.0 %    Basophil% 0.4 0.0 - 1.9 %    Differential Method Automated    Comprehensive metabolic panel   Result Value Ref Range    Sodium 139 136 - 145 mmol/L    Potassium 3.8 3.5 - 5.1 mmol/L    Chloride 106 95 - 110 mmol/L    CO2 23 23 - 29 mmol/L    Glucose 113 (H) 70 - 110 mg/dL    BUN, Bld 11 6 - 20 mg/dL    Creatinine 0.8 0.5 - 1.4 mg/dL    Calcium 9.4 8.7 - 10.5 mg/dL    Total Protein 7.1 6.0 - 8.4 g/dL    Albumin 3.7 3.5 - 5.2 g/dL    Total Bilirubin 0.3 0.1 - 1.0 mg/dL    Alkaline Phosphatase 66 55 - 135 U/L    AST 13 10 - 40 U/L    ALT 9 (L) 10 - 44 U/L    Anion Gap 10 8 - 16 mmol/L    eGFR if African American >60.0 >60 mL/min/1.73 m^2    eGFR if non African American >60.0 >60 mL/min/1.73 m^2   Urinalysis Microscopic   Result Value Ref Range     RBC, UA >100 (H) 0 - 4 /hpf    WBC, UA >100 (H) 0 - 5 /hpf    Bacteria, UA Rare None-Occ /hpf    Yeast, UA None None    Squam Epithel, UA 0 /hpf    Hyaline Casts, UA 0 0-1/lpf /lpf    Microscopic Comment SEE COMMENT                             ED Course     01:35 AM she reports intermittent spotting.  Unclear if the blood and urine represents urine source versus vaginal spotting.  I discussed treatment options such as not repeating a urine and getting a CT scan looking for kidney stone.  I did discuss risks benefits of CT scan.  Other option would be to repeat a catheter UA.  At this point in time, mother and patient would like to repeat a catheter UA.  In meantime we'll get blood work.    2:42 AM reevaluated patient, she appears to be resting comfortably.  Not requesting any pain medication.  All questions answered.    3:13 AM repeat examination of urine demonstrates that there is greater than 100 reds as well as 100 whites.  Urine culture is pending on that.  I discussed with patient and mother option of getting CT scan to look for kidney stone.  At this point time, they would like to wait to have a CT scan.  I discussed at this point in time I do not have after hours ability to ultrasound.  They request a prescription for outpatient ultrasound.  I discussed with them to return to the emergency room should there be any nausea, vomiting, worsening pain.  I did also discussed with them that should there be a kidney stone that is infected, this could potentially lead to sepsis which could potentially be a life-threatening condition.  Patient and mother verbalized understanding.  Again it like to pursue further outpatient workup.  They were invited to return to the emergency room should there be any worsening condition specifically worsening pain, nausea, vomiting, fever.  All questions answered.    Medications   ciprofloxacin HCl tablet 500 mg (500 mg Oral Given 12/4/17 0322)       3:30  AM Reassessment:   Khai reassessed the pt. repeat abdominal exam demonstrates soft, no rebound or guarding no masses.  The pt is resting comfortably and is NAD.  Pt states their sx have improved. Discussed test results, shared treatment plan, specific conditions for return, and the need for f/u.  Answered their questions at this time.  Pt understands and agrees to the plan.  The pt has remained hemodynamically stable through ED course and is stable for discharge.     Follow-up Information     Vishal Grace DO. Schedule an appointment as soon as possible for a visit in 2 days.    Specialty:  Family Medicine  Why:  Drink plenty of fluids.  Return to the ED for:  nausea, vomiting, fever, weakness or worsening condition.  Contact information:  [address] [address] [address] [phone]                     Discharge Medication List as of 12/4/2017  3:20 AM      START taking these medications    Details   ciprofloxacin HCl (CIPRO) 500 MG tablet Take 1 tablet (500 mg total) by mouth 2 (two) times daily., Starting Mon 12/4/2017, Until u 12/14/2017, Print      ketorolac (TORADOL) 10 mg tablet Take 1 tablet (10 mg total) by mouth every 6 (six) hours as needed for Pain., Starting Mon 12/4/2017, Print              Discharge Medication List as of 12/4/2017  3:20 AM        I discussed with patient and/or family/caretaker that evaluation in the ED does not suggest any emergent or life threatening medical conditions requiring immediate intervention beyond what was provided in the ED, and I believe patient is safe for discharge.  Regardless, an unremarkable evaluation in the ED does not preclude the development or presence of a serious of life threatening condition. As such, patient was instructed to return immediately for any worsening or change in current symptoms.       Clinical Impression:       ICD-10-CM ICD-9-CM   1. Left flank pain R10.9 789.09   2. Acute cystitis with hematuria N30.01 595.0           Disposition:    Disposition: Discharged  Condition: Stable                        Fidelina Ridley, DO  12/04/17 0456

## 2017-12-04 NOTE — TELEPHONE ENCOUNTER
Pt contacted in regards to ER visit, pt stated she did not schedule u/s as of yet and will do so at her convenience. Pt stated her pain has subside and she's feeling better. Nurse advised pt to schedule appt for u/s as soon as she can to follow up on symptoms.  Pt voiced understanding,

## 2017-12-05 LAB — BACTERIA UR CULT: NO GROWTH

## 2018-11-26 ENCOUNTER — HOSPITAL ENCOUNTER (EMERGENCY)
Facility: HOSPITAL | Age: 28
Discharge: HOME | End: 2018-11-26
Attending: EMERGENCY MEDICINE
Payer: COMMERCIAL

## 2018-11-26 VITALS
RESPIRATION RATE: 20 BRPM | BODY MASS INDEX: 35.74 KG/M2 | TEMPERATURE: 99 F | WEIGHT: 183 LBS | HEART RATE: 71 BPM | SYSTOLIC BLOOD PRESSURE: 107 MMHG | DIASTOLIC BLOOD PRESSURE: 62 MMHG | OXYGEN SATURATION: 99 %

## 2018-11-26 DIAGNOSIS — R10.9 RIGHT FLANK PAIN: ICD-10-CM

## 2018-11-26 DIAGNOSIS — N83.201 RIGHT OVARIAN CYST: Primary | ICD-10-CM

## 2018-11-26 DIAGNOSIS — R16.1 SPLENOMEGALY: ICD-10-CM

## 2018-11-26 DIAGNOSIS — R16.1 SPLENIC MASS: ICD-10-CM

## 2018-11-26 LAB
ALBUMIN SERPL BCP-MCNC: 4.3 G/DL
ALP SERPL-CCNC: 59 U/L
ALT SERPL W/O P-5'-P-CCNC: 6 U/L
ANION GAP SERPL CALC-SCNC: 9 MMOL/L
AST SERPL-CCNC: 16 U/L
B-HCG UR QL: NEGATIVE
BASOPHILS # BLD AUTO: 0.02 K/UL
BASOPHILS NFR BLD: 0.2 %
BILIRUB SERPL-MCNC: 0.4 MG/DL
BILIRUB UR QL STRIP: NEGATIVE
BUN SERPL-MCNC: 14 MG/DL
CALCIUM SERPL-MCNC: 9.8 MG/DL
CHLORIDE SERPL-SCNC: 107 MMOL/L
CLARITY UR REFRACT.AUTO: CLEAR
CO2 SERPL-SCNC: 23 MMOL/L
COLOR UR AUTO: ABNORMAL
CREAT SERPL-MCNC: 0.9 MG/DL
DIFFERENTIAL METHOD: ABNORMAL
EOSINOPHIL # BLD AUTO: 0 K/UL
EOSINOPHIL NFR BLD: 0.3 %
ERYTHROCYTE [DISTWIDTH] IN BLOOD BY AUTOMATED COUNT: 14.1 %
EST. GFR  (AFRICAN AMERICAN): >60 ML/MIN/1.73 M^2
EST. GFR  (NON AFRICAN AMERICAN): >60 ML/MIN/1.73 M^2
GLUCOSE SERPL-MCNC: 98 MG/DL
GLUCOSE UR QL STRIP: NEGATIVE
HCT VFR BLD AUTO: 39.2 %
HGB BLD-MCNC: 12.5 G/DL
HGB UR QL STRIP: NEGATIVE
KETONES UR QL STRIP: NEGATIVE
LEUKOCYTE ESTERASE UR QL STRIP: NEGATIVE
LIPASE SERPL-CCNC: 22 U/L
LYMPHOCYTES # BLD AUTO: 2.3 K/UL
LYMPHOCYTES NFR BLD: 23.2 %
MCH RBC QN AUTO: 27.8 PG
MCHC RBC AUTO-ENTMCNC: 31.9 G/DL
MCV RBC AUTO: 87 FL
MONOCYTES # BLD AUTO: 0.4 K/UL
MONOCYTES NFR BLD: 3.9 %
NEUTROPHILS # BLD AUTO: 7 K/UL
NEUTROPHILS NFR BLD: 72.2 %
NITRITE UR QL STRIP: NEGATIVE
PH UR STRIP: 6 [PH] (ref 5–8)
PLATELET # BLD AUTO: 179 K/UL
PMV BLD AUTO: 12.1 FL
POTASSIUM SERPL-SCNC: 4 MMOL/L
PROT SERPL-MCNC: 7.8 G/DL
PROT UR QL STRIP: NEGATIVE
RBC # BLD AUTO: 4.5 M/UL
SODIUM SERPL-SCNC: 139 MMOL/L
SP GR UR STRIP: 1.02 (ref 1–1.03)
URN SPEC COLLECT METH UR: ABNORMAL
UROBILINOGEN UR STRIP-ACNC: ABNORMAL EU/DL
WBC # BLD AUTO: 9.7 K/UL

## 2018-11-26 PROCEDURE — 83690 ASSAY OF LIPASE: CPT

## 2018-11-26 PROCEDURE — 85025 COMPLETE CBC W/AUTO DIFF WBC: CPT

## 2018-11-26 PROCEDURE — 63600175 PHARM REV CODE 636 W HCPCS: Performed by: EMERGENCY MEDICINE

## 2018-11-26 PROCEDURE — 96375 TX/PRO/DX INJ NEW DRUG ADDON: CPT

## 2018-11-26 PROCEDURE — 99284 EMERGENCY DEPT VISIT MOD MDM: CPT

## 2018-11-26 PROCEDURE — 81003 URINALYSIS AUTO W/O SCOPE: CPT

## 2018-11-26 PROCEDURE — 96361 HYDRATE IV INFUSION ADD-ON: CPT

## 2018-11-26 PROCEDURE — 96374 THER/PROPH/DIAG INJ IV PUSH: CPT

## 2018-11-26 PROCEDURE — 81025 URINE PREGNANCY TEST: CPT

## 2018-11-26 PROCEDURE — 80053 COMPREHEN METABOLIC PANEL: CPT

## 2018-11-26 PROCEDURE — 25000003 PHARM REV CODE 250: Performed by: EMERGENCY MEDICINE

## 2018-11-26 RX ORDER — NAPROXEN 500 MG/1
500 TABLET ORAL 2 TIMES DAILY WITH MEALS
Qty: 20 TABLET | Refills: 0 | Status: SHIPPED | OUTPATIENT
Start: 2018-11-26 | End: 2018-12-06

## 2018-11-26 RX ORDER — ONDANSETRON 2 MG/ML
4 INJECTION INTRAMUSCULAR; INTRAVENOUS
Status: COMPLETED | OUTPATIENT
Start: 2018-11-26 | End: 2018-11-26

## 2018-11-26 RX ORDER — KETOROLAC TROMETHAMINE 30 MG/ML
30 INJECTION, SOLUTION INTRAMUSCULAR; INTRAVENOUS
Status: COMPLETED | OUTPATIENT
Start: 2018-11-26 | End: 2018-11-26

## 2018-11-26 RX ADMIN — SODIUM CHLORIDE 1000 ML: 0.9 INJECTION, SOLUTION INTRAVENOUS at 12:11

## 2018-11-26 RX ADMIN — KETOROLAC TROMETHAMINE 30 MG: 30 INJECTION INTRAMUSCULAR; INTRAVENOUS at 12:11

## 2018-11-26 RX ADMIN — ONDANSETRON 4 MG: 2 INJECTION, SOLUTION INTRAMUSCULAR; INTRAVENOUS at 12:11

## 2018-11-26 NOTE — DISCHARGE INSTRUCTIONS
Follow up with PCP/Gen Surg/OB for further evaluation of ovarian cyst, splenomegaly with masses in clinic within one week.  Return to ER if symptoms persist or worsen.    Regarding ABDOMINAL PAIN, I recommended that the patient: Sip water or other clear fluids; avoid solid food for the first few hours after vomiting or diarrhea; if vomiting, wait 6 hours, and then eat small amounts of mild foods such as rice, applesauce, or crackers; avoid dairy products; avoid citrus, high-fat foods, fried or greasy foods, tomato products, caffeine, alcohol, and carbonated beverages;  avoid aspirin, ibuprofen or other anti-inflammatory medications, and narcotic pain medications unless prescribed.  In regards to prevention, I encouraged patient to:  Avoid fatty or greasy foods; drink plenty of water each day; eat small meals more frequently; exercise regularly; limit foods that produce gas; make sure meals are well-balanced and high in fiber and include plenty of fruits and vegetables.

## 2019-02-16 PROBLEM — R10.9 RIGHT FLANK PAIN: Status: RESOLVED | Noted: 2018-11-26 | Resolved: 2019-02-16

## 2019-02-16 PROBLEM — Z28.9 DELAYED IMMUNIZATIONS: Status: ACTIVE | Noted: 2019-02-16

## 2019-02-18 ENCOUNTER — HOSPITAL ENCOUNTER (OUTPATIENT)
Dept: RADIOLOGY | Facility: HOSPITAL | Age: 29
Discharge: HOME | End: 2019-02-18
Attending: FAMILY MEDICINE
Payer: COMMERCIAL

## 2019-02-18 ENCOUNTER — OFFICE VISIT (OUTPATIENT)
Dept: INTERNAL MEDICINE | Facility: CLINIC | Age: 29
End: 2019-02-18
Payer: COMMERCIAL

## 2019-02-18 ENCOUNTER — PATIENT OUTREACH (OUTPATIENT)
Dept: ADMINISTRATIVE | Facility: HOSPITAL | Age: 29
End: 2019-02-18

## 2019-02-18 VITALS
WEIGHT: 173.75 LBS | DIASTOLIC BLOOD PRESSURE: 69 MMHG | RESPIRATION RATE: 16 BRPM | TEMPERATURE: 98 F | HEART RATE: 84 BPM | BODY MASS INDEX: 34.11 KG/M2 | SYSTOLIC BLOOD PRESSURE: 113 MMHG | HEIGHT: 60 IN | OXYGEN SATURATION: 98 %

## 2019-02-18 DIAGNOSIS — Z28.9 DELAYED IMMUNIZATIONS: ICD-10-CM

## 2019-02-18 DIAGNOSIS — D73.89 SPLENIC LESION: ICD-10-CM

## 2019-02-18 DIAGNOSIS — R10.12 LEFT UPPER QUADRANT PAIN: ICD-10-CM

## 2019-02-18 DIAGNOSIS — R10.12 LEFT UPPER QUADRANT PAIN: Primary | ICD-10-CM

## 2019-02-18 LAB
B-HCG UR QL: NEGATIVE
CTP QC/QA: YES

## 2019-02-18 PROCEDURE — 74160 CT ABDOMEN WITH CONTRAST: ICD-10-PCS | Mod: 26,,, | Performed by: RADIOLOGY

## 2019-02-18 PROCEDURE — 99214 OFFICE O/P EST MOD 30 MIN: CPT | Mod: 25,S$GLB,, | Performed by: FAMILY MEDICINE

## 2019-02-18 PROCEDURE — 90471 IMMUNIZATION ADMIN: CPT | Mod: S$GLB,,, | Performed by: FAMILY MEDICINE

## 2019-02-18 PROCEDURE — 99999 PR PBB SHADOW E&M-EST. PATIENT-LVL III: ICD-10-PCS | Mod: PBBFAC,,, | Performed by: FAMILY MEDICINE

## 2019-02-18 PROCEDURE — 25500020 PHARM REV CODE 255: Mod: PO | Performed by: FAMILY MEDICINE

## 2019-02-18 PROCEDURE — 81025 POCT URINE PREGNANCY: ICD-10-PCS | Mod: S$GLB,,, | Performed by: FAMILY MEDICINE

## 2019-02-18 PROCEDURE — 99999 PR PBB SHADOW E&M-EST. PATIENT-LVL III: CPT | Mod: PBBFAC,,, | Performed by: FAMILY MEDICINE

## 2019-02-18 PROCEDURE — 74160 CT ABDOMEN W/CONTRAST: CPT | Mod: 26,,, | Performed by: RADIOLOGY

## 2019-02-18 PROCEDURE — 90715 TDAP VACCINE 7 YRS/> IM: CPT | Mod: S$GLB,,, | Performed by: FAMILY MEDICINE

## 2019-02-18 PROCEDURE — 99214 PR OFFICE/OUTPT VISIT, EST, LEVL IV, 30-39 MIN: ICD-10-PCS | Mod: 25,S$GLB,, | Performed by: FAMILY MEDICINE

## 2019-02-18 PROCEDURE — 74160 CT ABDOMEN W/CONTRAST: CPT | Mod: TC,PO

## 2019-02-18 PROCEDURE — 3008F PR BODY MASS INDEX (BMI) DOCUMENTED: ICD-10-PCS | Mod: CPTII,S$GLB,, | Performed by: FAMILY MEDICINE

## 2019-02-18 PROCEDURE — 81025 URINE PREGNANCY TEST: CPT | Mod: S$GLB,,, | Performed by: FAMILY MEDICINE

## 2019-02-18 PROCEDURE — 90471 TDAP VACCINE GREATER THAN OR EQUAL TO 7YO IM: ICD-10-PCS | Mod: S$GLB,,, | Performed by: FAMILY MEDICINE

## 2019-02-18 PROCEDURE — 90715 TDAP VACCINE GREATER THAN OR EQUAL TO 7YO IM: ICD-10-PCS | Mod: S$GLB,,, | Performed by: FAMILY MEDICINE

## 2019-02-18 PROCEDURE — 3008F BODY MASS INDEX DOCD: CPT | Mod: CPTII,S$GLB,, | Performed by: FAMILY MEDICINE

## 2019-02-18 RX ORDER — DICYCLOMINE HYDROCHLORIDE 10 MG/1
10 CAPSULE ORAL
Qty: 30 CAPSULE | Refills: 0 | Status: SHIPPED | OUTPATIENT
Start: 2019-02-18 | End: 2019-02-26

## 2019-02-18 RX ADMIN — IOHEXOL 75 ML: 350 INJECTION, SOLUTION INTRAVENOUS at 10:02

## 2019-02-18 NOTE — PROGRESS NOTES
Subjective:       Patient ID: Ghada Oreilly is a 29 y.o. female.    Chief Complaint: Follow-up (ER follow up ( Left side pain))    Abdominal Cramping   This is a recurrent problem. Episode onset: 3 months. The onset quality is gradual. The problem occurs every several days. The most recent episode lasted 3 months. The problem has been unchanged. The pain is located in the LUQ. The pain is moderate. The quality of the pain is aching. The abdominal pain does not radiate. Pertinent negatives include no constipation, diarrhea, fever, flatus, nausea, vomiting or weight loss. Nothing aggravates the pain. Relieved by: sleeping on back. She has tried nothing for the symptoms. The treatment provided no relief.     Review of Systems   Constitutional: Negative for fever and weight loss.   Respiratory: Negative for shortness of breath.    Cardiovascular: Negative for chest pain.   Gastrointestinal: Positive for abdominal pain. Negative for blood in stool, constipation, diarrhea, flatus, nausea and vomiting.       Objective:      Physical Exam   Constitutional: She appears well-developed and well-nourished. No distress.   HENT:   Head: Normocephalic and atraumatic.   Pulmonary/Chest: Effort normal and breath sounds normal. No respiratory distress. She has no wheezes.   Abdominal: Soft. She exhibits no distension. There is tenderness in the left upper quadrant. There is no guarding.   Skin: Skin is warm and dry. No rash noted. She is not diaphoretic. No erythema.   Nursing note and vitals reviewed.      Assessment:       1. Left upper quadrant pain    2. Delayed immunizations    3. Splenic lesion        Plan:     Problem List Items Addressed This Visit        ID    Delayed immunizations       Oncology    Splenic lesion    Relevant Orders    CT Abdomen With Contrast    Comprehensive metabolic panel    POCT Urine Pregnancy (Completed)       GI    Left upper quadrant pain - Primary    Relevant Medications    dicyclomine  (BENTYL) 10 MG capsule    Other Relevant Orders    CT Abdomen With Contrast    Comprehensive metabolic panel    POCT Urine Pregnancy (Completed)

## 2019-02-18 NOTE — PROGRESS NOTES
Please call pt with abnormal results. Pt does not need appt at this time, unless they have questions or wish to further discuss.  lesions noted throughout the spleen which do not appear significantly changed from prior but were not clearly cystic in nature.  We can do an ultrasound to further look at the spleen. Will place order

## 2019-02-21 ENCOUNTER — TELEPHONE (OUTPATIENT)
Dept: INTERNAL MEDICINE | Facility: CLINIC | Age: 29
End: 2019-02-21

## 2019-02-21 DIAGNOSIS — R16.1 SPLENOMEGALY: Primary | ICD-10-CM

## 2019-02-21 DIAGNOSIS — D73.89 SPLENIC LESION: ICD-10-CM

## 2019-02-21 NOTE — TELEPHONE ENCOUNTER
Spoke with pt about results. Pt voiced understanding of results. Will do US. Please put in US order

## 2019-02-21 NOTE — TELEPHONE ENCOUNTER
----- Message from oSledad Gonzalez sent at 2/21/2019 10:19 AM CST -----  Contact: Patient  Type:  Patient Returning Call    Who Called: Ghada  Who Left Message for Patient: the nurse  Does the patient know what this is regarding?: CT results  Would the patient rather a call back or a response via [facility]?  Call back  Best Call Back Number: [phone] cell  Additional Information: n/a    Soledad Alvarze

## 2019-03-04 ENCOUNTER — HOSPITAL ENCOUNTER (OUTPATIENT)
Dept: RADIOLOGY | Facility: HOSPITAL | Age: 29
Discharge: HOME | End: 2019-03-04
Attending: FAMILY MEDICINE
Payer: COMMERCIAL

## 2019-03-04 ENCOUNTER — OFFICE VISIT (OUTPATIENT)
Dept: INTERNAL MEDICINE | Facility: CLINIC | Age: 29
End: 2019-03-04
Payer: COMMERCIAL

## 2019-03-04 ENCOUNTER — TELEPHONE (OUTPATIENT)
Dept: INTERNAL MEDICINE | Facility: CLINIC | Age: 29
End: 2019-03-04

## 2019-03-04 VITALS
HEART RATE: 84 BPM | WEIGHT: 175.06 LBS | HEIGHT: 60 IN | DIASTOLIC BLOOD PRESSURE: 69 MMHG | RESPIRATION RATE: 16 BRPM | SYSTOLIC BLOOD PRESSURE: 114 MMHG | BODY MASS INDEX: 34.37 KG/M2 | OXYGEN SATURATION: 97 % | TEMPERATURE: 98 F

## 2019-03-04 DIAGNOSIS — R16.1 SPLENOMEGALY: ICD-10-CM

## 2019-03-04 DIAGNOSIS — D73.89 SPLENIC LESION: Primary | ICD-10-CM

## 2019-03-04 DIAGNOSIS — R16.1 SPLENOMEGALY: Primary | ICD-10-CM

## 2019-03-04 DIAGNOSIS — D73.89 SPLENIC LESION: ICD-10-CM

## 2019-03-04 PROCEDURE — 99213 PR OFFICE/OUTPT VISIT, EST, LEVL III, 20-29 MIN: ICD-10-PCS | Mod: S$GLB,,, | Performed by: FAMILY MEDICINE

## 2019-03-04 PROCEDURE — 76705 ECHO EXAM OF ABDOMEN: CPT | Mod: TC,PO

## 2019-03-04 PROCEDURE — 99213 OFFICE O/P EST LOW 20 MIN: CPT | Mod: S$GLB,,, | Performed by: FAMILY MEDICINE

## 2019-03-04 PROCEDURE — 99999 PR PBB SHADOW E&M-EST. PATIENT-LVL III: ICD-10-PCS | Mod: PBBFAC,,, | Performed by: FAMILY MEDICINE

## 2019-03-04 PROCEDURE — 3008F PR BODY MASS INDEX (BMI) DOCUMENTED: ICD-10-PCS | Mod: CPTII,S$GLB,, | Performed by: FAMILY MEDICINE

## 2019-03-04 PROCEDURE — 99999 PR PBB SHADOW E&M-EST. PATIENT-LVL III: CPT | Mod: PBBFAC,,, | Performed by: FAMILY MEDICINE

## 2019-03-04 PROCEDURE — 76705 ECHO EXAM OF ABDOMEN: CPT | Mod: 26,,, | Performed by: RADIOLOGY

## 2019-03-04 PROCEDURE — 76705 US ABDOMEN LIMITED_SPLEEN: ICD-10-PCS | Mod: 26,,, | Performed by: RADIOLOGY

## 2019-03-04 PROCEDURE — 3008F BODY MASS INDEX DOCD: CPT | Mod: CPTII,S$GLB,, | Performed by: FAMILY MEDICINE

## 2019-03-04 NOTE — PROGRESS NOTES
Please call pt with abnormal results. Pt does not need appt at this time, unless they have questions or wish to further discuss.  She has multiple lesions in her spleen. Will refer pt to heme/ onc for further eval.

## 2019-03-04 NOTE — TELEPHONE ENCOUNTER
----- Message from Ector Self MD sent at 3/4/2019  9:21 AM CST -----  Please call pt with abnormal results. Pt does not need appt at this time, unless they have questions or wish to further discuss.  She has multiple lesions in her spleen. Will refer pt to heme/ onc for further eval.

## 2019-03-04 NOTE — PROGRESS NOTES
Subjective:       Patient ID: Ghada Oreilly is a 29 y.o. female.    Chief Complaint: Follow-up (2 week f/u abdominal pain)    Abdominal Pain   This is a recurrent problem. The current episode started more than 1 month ago. The onset quality is gradual. The problem occurs intermittently. The problem has been gradually improving. The pain is located in the LUQ. The pain is moderate. The quality of the pain is aching. The abdominal pain does not radiate. The pain is relieved by movement. She has tried nothing for the symptoms. Prior diagnostic workup includes CT scan.     Review of Systems   Respiratory: Negative for shortness of breath.    Cardiovascular: Negative for chest pain.   Gastrointestinal: Positive for abdominal pain.       Objective:      Physical Exam   Constitutional: She appears well-developed and well-nourished. No distress.   HENT:   Head: Normocephalic and atraumatic.   Pulmonary/Chest: Effort normal and breath sounds normal. No respiratory distress. She has no wheezes.   Abdominal: Soft. She exhibits no distension. There is tenderness in the left upper quadrant. There is no guarding.   Skin: Skin is warm and dry. No rash noted. She is not diaphoretic. No erythema.   Nursing note and vitals reviewed.      Assessment:       1. Splenic lesion        Plan:     Problem List Items Addressed This Visit        Oncology    Splenic lesion - Primary    Current Assessment & Plan     Ref to heme /  Onc.

## 2019-03-20 ENCOUNTER — INITIAL CONSULT (OUTPATIENT)
Dept: HEMATOLOGY/ONCOLOGY | Facility: CLINIC | Age: 29
End: 2019-03-20
Payer: COMMERCIAL

## 2019-03-20 ENCOUNTER — LAB VISIT (OUTPATIENT)
Dept: LAB | Facility: HOSPITAL | Age: 29
End: 2019-03-20
Attending: INTERNAL MEDICINE
Payer: COMMERCIAL

## 2019-03-20 VITALS
SYSTOLIC BLOOD PRESSURE: 120 MMHG | WEIGHT: 172.81 LBS | BODY MASS INDEX: 33.93 KG/M2 | TEMPERATURE: 98 F | DIASTOLIC BLOOD PRESSURE: 80 MMHG | OXYGEN SATURATION: 98 % | HEART RATE: 78 BPM | RESPIRATION RATE: 18 BRPM | HEIGHT: 60 IN

## 2019-03-20 DIAGNOSIS — Z11.4 SCREENING FOR HIV (HUMAN IMMUNODEFICIENCY VIRUS): ICD-10-CM

## 2019-03-20 DIAGNOSIS — D73.89 SPLENIC LESION: Primary | ICD-10-CM

## 2019-03-20 DIAGNOSIS — D73.89 SPLENIC LESION: ICD-10-CM

## 2019-03-20 LAB
ALBUMIN SERPL BCP-MCNC: 4.1 G/DL
ALP SERPL-CCNC: 61 U/L
ALT SERPL W/O P-5'-P-CCNC: 5 U/L
ANION GAP SERPL CALC-SCNC: 10 MMOL/L
AST SERPL-CCNC: 11 U/L
BASOPHILS # BLD AUTO: 0.03 K/UL
BASOPHILS NFR BLD: 0.4 %
BILIRUB SERPL-MCNC: 0.3 MG/DL
BUN SERPL-MCNC: 10 MG/DL
CALCIUM SERPL-MCNC: 10 MG/DL
CHLORIDE SERPL-SCNC: 107 MMOL/L
CO2 SERPL-SCNC: 22 MMOL/L
CREAT SERPL-MCNC: 0.8 MG/DL
DIFFERENTIAL METHOD: ABNORMAL
EOSINOPHIL # BLD AUTO: 0.2 K/UL
EOSINOPHIL NFR BLD: 2.4 %
ERYTHROCYTE [DISTWIDTH] IN BLOOD BY AUTOMATED COUNT: 13.5 %
EST. GFR  (AFRICAN AMERICAN): >60 ML/MIN/1.73 M^2
EST. GFR  (NON AFRICAN AMERICAN): >60 ML/MIN/1.73 M^2
GLUCOSE SERPL-MCNC: 89 MG/DL
HCT VFR BLD AUTO: 40.8 %
HGB BLD-MCNC: 12.3 G/DL
IMM GRANULOCYTES # BLD AUTO: 0.01 K/UL
IMM GRANULOCYTES NFR BLD AUTO: 0.1 %
LYMPHOCYTES # BLD AUTO: 2.3 K/UL
LYMPHOCYTES NFR BLD: 33.3 %
MCH RBC QN AUTO: 27.5 PG
MCHC RBC AUTO-ENTMCNC: 30.1 G/DL
MCV RBC AUTO: 91 FL
MONOCYTES # BLD AUTO: 0.3 K/UL
MONOCYTES NFR BLD: 4 %
NEUTROPHILS # BLD AUTO: 4.1 K/UL
NEUTROPHILS NFR BLD: 59.9 %
NRBC BLD-RTO: 0 /100 WBC
PLATELET # BLD AUTO: 211 K/UL
PMV BLD AUTO: 11.1 FL
POTASSIUM SERPL-SCNC: 3.9 MMOL/L
PROT SERPL-MCNC: 7.5 G/DL
RBC # BLD AUTO: 4.48 M/UL
SODIUM SERPL-SCNC: 139 MMOL/L
WBC # BLD AUTO: 6.78 K/UL

## 2019-03-20 PROCEDURE — 36415 COLL VENOUS BLD VENIPUNCTURE: CPT

## 2019-03-20 PROCEDURE — 99245 PR OFFICE CONSULTATION,LEVEL V: ICD-10-PCS | Mod: S$GLB,,, | Performed by: INTERNAL MEDICINE

## 2019-03-20 PROCEDURE — 99999 PR PBB SHADOW E&M-EST. PATIENT-LVL III: ICD-10-PCS | Mod: PBBFAC,,, | Performed by: INTERNAL MEDICINE

## 2019-03-20 PROCEDURE — 99999 PR PBB SHADOW E&M-EST. PATIENT-LVL III: CPT | Mod: PBBFAC,,, | Performed by: INTERNAL MEDICINE

## 2019-03-20 PROCEDURE — 85025 COMPLETE CBC W/AUTO DIFF WBC: CPT

## 2019-03-20 PROCEDURE — 80053 COMPREHEN METABOLIC PANEL: CPT

## 2019-03-20 PROCEDURE — 99245 OFF/OP CONSLTJ NEW/EST HI 55: CPT | Mod: S$GLB,,, | Performed by: INTERNAL MEDICINE

## 2019-03-20 PROCEDURE — 86703 HIV-1/HIV-2 1 RESULT ANTBDY: CPT

## 2019-03-20 NOTE — PROGRESS NOTES
Subjective:       Patient ID: Ghada Oreilly is a 29 y.o. female.    Chief Complaint: Consult    HPI   This is a 29 year odl AA lady referred by Dr Yifor evaluation of her abnormal Ct of the spleen.    This 29 year old AA lady had a Ct of the abdomen done because of abdominal pain on 11/26/2018 that showed multiple splenic nodules. A repeat CT scan done 2/14/2018 showed thelesions were unchanged.   Showed lesions were no cysts.  She denies fever or sweats. Loss of 10 pounds in a year. Comes to discuss what to do going forward:  ALLERGIES: None  MEDICINES: see med card  SURGERIES: right ankle surgery after accident  FAMILY HX: No cancer or heart attacks. Mother, Maternal GM and maternal aunt had diabetes   PMH:  1-splenic lesions  Review of Systems   Constitutional: Negative.    HENT: Negative.    Eyes: Negative.    Respiratory: Negative.  Negative for cough and wheezing.    Cardiovascular: Negative.  Negative for chest pain.   Gastrointestinal: Negative.    Genitourinary: Negative.    Neurological: Negative.    Psychiatric/Behavioral: Negative.        Objective:      Physical Exam   Constitutional: She is oriented to person, place, and time. She appears well-developed. No distress.   HENT:   Head: Normocephalic.   Right Ear: Tympanic membrane, external ear and ear canal normal.   Left Ear: Tympanic membrane, external ear and ear canal normal.   Nose: Nose normal. Right sinus exhibits no maxillary sinus tenderness and no frontal sinus tenderness. Left sinus exhibits no maxillary sinus tenderness and no frontal sinus tenderness.   Mouth/Throat: Oropharynx is clear and moist and mucous membranes are normal.   Teeth normal.  Gums normal.   Eyes: Conjunctivae and lids are normal. Pupils are equal, round, and reactive to light.   Neck: Normal carotid pulses, no hepatojugular reflux and no JVD present. Carotid bruit is not present. No tracheal deviation present. No thyroid mass and no thyromegaly present.    Cardiovascular: Normal rate, regular rhythm, S1 normal, S2 normal, normal heart sounds and intact distal pulses. Exam reveals no gallop and no friction rub.   No murmur heard.  Carotid exam normal   Pulmonary/Chest: Effort normal and breath sounds normal. No accessory muscle usage. No respiratory distress. She has no wheezes. She has no rales. She exhibits no tenderness.   Abdominal: Soft. Normal appearance. She exhibits no distension and no mass. There is no splenomegaly or hepatomegaly. There is no tenderness. There is no rebound and no guarding.   Musculoskeletal: Normal range of motion. She exhibits no edema or tenderness.        Right hand: Normal.        Left hand: Normal.       Lymphadenopathy:     She has no cervical adenopathy.     She has no axillary adenopathy.        Right: No inguinal and no supraclavicular adenopathy present.        Left: No inguinal and no supraclavicular adenopathy present.   Neurological: She is alert and oriented to person, place, and time. She has normal strength. No cranial nerve deficit. Coordination normal.   Skin: Skin is warm and dry. No rash noted. She is not diaphoretic. No cyanosis or erythema. No pallor. Nails show no clubbing.   Psychiatric: She has a normal mood and affect. Her behavior is normal. Judgment and thought content normal.       Wt Readings from Last 3 Encounters:   03/20/19 78.4 kg (172 lb 13.5 oz)   03/04/19 79.4 kg (175 lb 0.7 oz)   02/18/19 78.8 kg (173 lb 11.6 oz)     Temp Readings from Last 3 Encounters:   03/20/19 98.4 °F (36.9 °C) (Oral)   03/04/19 97.6 °F (36.4 °C) (Tympanic)   02/18/19 98.2 °F (36.8 °C) (Tympanic)     BP Readings from Last 3 Encounters:   03/20/19 120/80   03/04/19 114/69   02/18/19 113/69     Pulse Readings from Last 3 Encounters:   03/20/19 78   03/04/19 84   02/18/19 84       Assessment:       1. Splenic lesion    2. Screening for HIV (human immunodeficiency virus)        Plan:       The imaging studies were personally reviewed  and also reviewed with the patient.  The lesions in the spleen are very obvious.  We discussed potential causes for these spleen nodules including sarcoidosis, infections, benign and malignant tumors.  Metastatic disease to the spleen is very unusual.  We will order a Ct/PET. HIV and cbc/cmp.  We will discuss her case at our Tumor Board Conference of Fri April 5.  At that time, we will discuss the easiest way of obtaining tissue diagnosis     See me WEd April 10

## 2019-03-20 NOTE — LETTER
March 20, 2019      Ector Self MD  [address] [address] [address]           [facility] - Hematology Oncology  [address] [facility] [address]  Phone: [phone]  Fax: [phone]          Patient: Ghada Oreilly   MR Number: [id number]   YOB: 1990   Date of Visit: 3/20/2019       Dear Dr. Ector Self:    Thank you for referring Ghada Oreilly to me for evaluation. Attached you will find relevant portions of my assessment and plan of care.    If you have questions, please do not hesitate to call me. I look forward to following Ghada Oreilly along with you.    Sincerely,    Sabino Jackson MD    Enclosure  CC:  No Recipients    If you would like to receive this communication electronically, please contact [email or web address] or [phone] to request more information on [employer] Link access.    For providers and/or their staff who would like to refer a patient to [facility], please contact us through our one-stop-shop provider referral line, [facility], at [phone].    If you feel you have received this communication in error or would no longer like to receive these types of communications, please e-mail [email or web address]

## 2019-03-20 NOTE — LETTER
03/20/2019                 [facility] Hematology Oncology  [address] [facility]  [place] [address]  Phone: [phone]  Fax: [phone]   03/20/2019    Patient: Ghada Oreilly   YOB: 1990   Date of Visit: 3/20/2019       To Whom it May Concern:    Ghada Oreilly was seen in my clinic on 3/20/2019. She may return to work on 3/20/19.    If you have any questions or concerns, please don't hesitate to call.    Sincerely,   MD Nicki Baeza, DAMIANN

## 2019-03-21 LAB — HIV 1+2 AB+HIV1 P24 AG SERPL QL IA: NEGATIVE

## 2019-03-28 ENCOUNTER — DOCUMENTATION ONLY (OUTPATIENT)
Dept: HEMATOLOGY/ONCOLOGY | Facility: CLINIC | Age: 29
End: 2019-03-28

## 2019-03-28 ENCOUNTER — TELEPHONE (OUTPATIENT)
Dept: HEMATOLOGY/ONCOLOGY | Facility: CLINIC | Age: 29
End: 2019-03-28

## 2019-03-28 NOTE — PROGRESS NOTES
Nurse called pt to notified her that [facility] denied to pay for her PET CT after the provider completed the peer to peer review.

## 2019-03-28 NOTE — TELEPHONE ENCOUNTER
LM for pt to call back regarding insurance denial of PET and to notify that we will be discussing her case at tumor conference.  Pt is to see Dr. Jackson on 4/10 to discuss recommendations.

## 2019-03-29 ENCOUNTER — TELEPHONE (OUTPATIENT)
Dept: HEMATOLOGY/ONCOLOGY | Facility: CLINIC | Age: 29
End: 2019-03-29

## 2019-03-29 NOTE — TELEPHONE ENCOUNTER
Pt called back to clinic after message left.  Discussed with pt that her insurance has denied her PET scan at this time.  Explained that Dr. Jackson is going to be presenting her case at tumor board.  Explained to pt what tumor board is and how we discuss the pts diagnosis with different specialties to also give recommendations using this interdisciplinary approach.  Instructed pt to keep current appt with Dr. Jackson on 4/10 and that he will discuss the recommendations at that time.  Pt verbalized understanding of all information given.  Pt has my direct phone number.  Instructed pt to call me if she has any additional questions or concerns.

## 2019-04-10 ENCOUNTER — INFUSION (OUTPATIENT)
Dept: INFUSION THERAPY | Facility: HOSPITAL | Age: 29
End: 2019-04-10
Attending: INTERNAL MEDICINE
Payer: COMMERCIAL

## 2019-04-10 ENCOUNTER — OFFICE VISIT (OUTPATIENT)
Dept: HEMATOLOGY/ONCOLOGY | Facility: CLINIC | Age: 29
End: 2019-04-10
Payer: COMMERCIAL

## 2019-04-10 VITALS
SYSTOLIC BLOOD PRESSURE: 100 MMHG | WEIGHT: 170.19 LBS | BODY MASS INDEX: 33.41 KG/M2 | OXYGEN SATURATION: 97 % | HEART RATE: 92 BPM | RESPIRATION RATE: 18 BRPM | TEMPERATURE: 99 F | HEIGHT: 60 IN | DIASTOLIC BLOOD PRESSURE: 66 MMHG

## 2019-04-10 DIAGNOSIS — Z00.00 HEALTH CARE MAINTENANCE: Primary | ICD-10-CM

## 2019-04-10 DIAGNOSIS — D73.89 SPLENIC LESION: ICD-10-CM

## 2019-04-10 PROCEDURE — 90734 MENACWYD/MENACWYCRM VACC IM: CPT | Mod: ,,, | Performed by: INTERNAL MEDICINE

## 2019-04-10 PROCEDURE — 99999 PR PBB SHADOW E&M-EST. PATIENT-LVL III: ICD-10-PCS | Mod: PBBFAC,,, | Performed by: INTERNAL MEDICINE

## 2019-04-10 PROCEDURE — 90648 HIB PRP-T VACCINE 4 DOSE IM: CPT | Mod: ,,, | Performed by: INTERNAL MEDICINE

## 2019-04-10 PROCEDURE — 99215 OFFICE O/P EST HI 40 MIN: CPT | Mod: 25,S$GLB,, | Performed by: INTERNAL MEDICINE

## 2019-04-10 PROCEDURE — 99215 PR OFFICE/OUTPT VISIT, EST, LEVL V, 40-54 MIN: ICD-10-PCS | Mod: 25,S$GLB,, | Performed by: INTERNAL MEDICINE

## 2019-04-10 PROCEDURE — 90471 MENINGOCOCCAL CONJUGATE VACCINE 4-VALENT IM (MENACTRA): ICD-10-PCS | Mod: ,,, | Performed by: INTERNAL MEDICINE

## 2019-04-10 PROCEDURE — 90472 IMMUNIZATION ADMIN EACH ADD: CPT | Mod: ,,, | Performed by: INTERNAL MEDICINE

## 2019-04-10 PROCEDURE — 90472 HIB PRP-T CONJUGATE VACCINE 4 DOSE IM: ICD-10-PCS | Mod: ,,, | Performed by: INTERNAL MEDICINE

## 2019-04-10 PROCEDURE — 99999 PR PBB SHADOW E&M-EST. PATIENT-LVL III: CPT | Mod: PBBFAC,,, | Performed by: INTERNAL MEDICINE

## 2019-04-10 PROCEDURE — 90734 MENINGOCOCCAL CONJUGATE VACCINE 4-VALENT IM (MENACTRA): ICD-10-PCS | Mod: ,,, | Performed by: INTERNAL MEDICINE

## 2019-04-10 PROCEDURE — 90471 IMMUNIZATION ADMIN: CPT | Mod: ,,, | Performed by: INTERNAL MEDICINE

## 2019-04-10 PROCEDURE — 90670 PNEUMOCOCCAL CONJUGATE VACCINE 13-VALENT LESS THAN 5YO & GREATER THAN: ICD-10-PCS | Mod: ,,, | Performed by: INTERNAL MEDICINE

## 2019-04-10 PROCEDURE — 3008F BODY MASS INDEX DOCD: CPT | Mod: CPTII,S$GLB,, | Performed by: INTERNAL MEDICINE

## 2019-04-10 PROCEDURE — 90648 HIB PRP-T CONJUGATE VACCINE 4 DOSE IM: ICD-10-PCS | Mod: ,,, | Performed by: INTERNAL MEDICINE

## 2019-04-10 PROCEDURE — 3008F PR BODY MASS INDEX (BMI) DOCUMENTED: ICD-10-PCS | Mod: CPTII,S$GLB,, | Performed by: INTERNAL MEDICINE

## 2019-04-10 PROCEDURE — 90670 PCV13 VACCINE IM: CPT | Mod: ,,, | Performed by: INTERNAL MEDICINE

## 2019-04-10 RX ORDER — TINIDAZOLE 500 MG/1
TABLET ORAL
Refills: 0 | COMMUNITY
Start: 2019-04-08 | End: 2019-06-10

## 2019-04-10 NOTE — DISCHARGE INSTRUCTIONS
Please drink plenty of water on the day of your vaccinations.   Do not drink alcohol on the same day as your injections.   You may take two regular strength Tylenol every 4 hours if needed for  flu like symptoms that you may experience following your vaccines.  Call if you experience any rash like symptoms.[facility]  [address] [address]  [phone] phone     [phone] fax  Hours of Operation: Monday- Friday 8:00am- 5:00pm  After hours phone  [phone]  Hematology / Oncology Physicians on call      Dr. Froylan Fu      Please call with any concerns regarding your appointment today.

## 2019-04-10 NOTE — LETTER
April 10, 2019      Ector Self MD  [address] [address] [address]           [facility] - Hematology Oncology  [address] [facility] [address]  Phone: [phone]  Fax: [phone]          Patient: Ghada Oreilly   MR Number: [id number]   YOB: 1990   Date of Visit: 4/10/2019       Dear Dr. Ector Self:    Thank you for referring Ghada Oreilly to me for evaluation. Attached you will find relevant portions of my assessment and plan of care.    If you have questions, please do not hesitate to call me. I look forward to following Ghada Oreilly along with you.    Sincerely,    Sabino Jackson MD    Enclosure  CC:  No Recipients    If you would like to receive this communication electronically, please contact [email or web address] or [phone] to request more information on [employer] Link access.    For providers and/or their staff who would like to refer a patient to [facility], please contact us through our one-stop-shop provider referral line, [facility] , at [phone].    If you feel you have received this communication in error or would no longer like to receive these types of communications, please e-mail [email or web address]

## 2019-04-10 NOTE — PROGRESS NOTES
Subjective:       Patient ID: Ghada Oreilly is a 29 y.o. female.    Chief Complaint: Follow-up    HPI This is a 29 year odl AA lady referred by Dr Yifor evaluation of her abnormal Ct of the spleen.     This 29 year old AA lady had a Ct of the abdomen done because of abdominal pain on 11/26/2018 that showed multiple splenic nodules. A repeat CT scan done 2/14/2018 showed thelesions were unchanged.   Showed lesions were no cysts.  She denies fever or sweats. Loss of 10 pounds in a year.   I requested a Ct/PET that was denied by her insurance.  I have discussed her case with general surgery, interventional radiology and among other colleges in my Department. She comes to discuss what the recommendations areALLERGIES: None  MEDICINES: see med card  SURGERIES: right ankle surgery after accident  FAMILY HX: No cancer or heart attacks. Mother, Maternal GM and maternal aunt had diabetes   PMH:  1-splenic lesions      Review of Systems   Constitutional: Negative.    HENT: Negative.    Eyes: Negative.    Respiratory: Negative.  Negative for cough and wheezing.    Cardiovascular: Negative.  Negative for chest pain.   Gastrointestinal: Negative.    Genitourinary: Negative.    Neurological: Negative.    Psychiatric/Behavioral: Negative.        Objective:      Physical Exam   Constitutional: She is oriented to person, place, and time. She appears well-developed and well-nourished.   Eyes: Pupils are equal, round, and reactive to light.   Neck: Normal range of motion. Neck supple.   Cardiovascular: Normal rate, regular rhythm and normal heart sounds. Exam reveals no gallop.   No murmur heard.  Pulmonary/Chest: No respiratory distress. She has no wheezes. She has no rales. She exhibits no tenderness.   Abdominal: Soft. Bowel sounds are normal. She exhibits no mass. There is no tenderness. There is no rebound and no guarding.   Musculoskeletal: Normal range of motion.   Neurological: She is alert and oriented to person,  place, and time.   Skin: Skin is warm and dry.       Wt Readings from Last 3 Encounters:   04/10/19 77.2 kg (170 lb 3.1 oz)   03/20/19 78.4 kg (172 lb 13.5 oz)   03/04/19 79.4 kg (175 lb 0.7 oz)     Temp Readings from Last 3 Encounters:   04/10/19 98.9 °F (37.2 °C) (Oral)   03/20/19 98.4 °F (36.9 °C) (Oral)   03/04/19 97.6 °F (36.4 °C) (Tympanic)     BP Readings from Last 3 Encounters:   04/10/19 100/66   03/20/19 120/80   03/04/19 114/69     Pulse Readings from Last 3 Encounters:   04/10/19 92   03/20/19 78   03/04/19 84       Assessment:       1. Health care maintenance    2. Splenic lesion        Plan:       The case was discussed with general surgery . I was informed they would agreed to  proceed with splenectomy if Interventional radiology   felt they did not want to do a CT guided biopsy.  I talked to dr Sven waters who informed he would be willing to proceed with a Ct guided biopsy of one of the nodules. She would be asked to wait for several hors after the procedure for monitoring in case of bleeding.  I have asked her to have immunizations against meningitis, pneumoccoccus and hemophilus just in case the need for splenectomy arises. .They will be given today.  Since the Ct/pET was denied, I will order a Ct of the chest. See me a week after the biopsy which would be done at least 2 weeks from the day of the immunizations.

## 2019-04-12 ENCOUNTER — TUMOR BOARD CONFERENCE (OUTPATIENT)
Dept: INFUSION THERAPY | Facility: HOSPITAL | Age: 29
End: 2019-04-12

## 2019-04-12 NOTE — PROGRESS NOTES
Interdisciplinary Cancer Conference    Ghada Oreilly    Female    Date Presented to Tumor Board: 04/11/19    Presenting Hospital / Clinic: [facility]        Presenter: Dr. Miller Galeano    Reason for Consultation: Initial Presentation    Specialties Present: Medical Oncology;Radiation Oncology;Surgical Oncology;Pathology;Navigation;Plastic Surgery;Gastrointestinal;Radiology;Pulmonology    Patient Status: a new patient     Diagnosis: Splenic Lesions    Treatment to Date: None    Clinical Trial Eligibility: Not discussed         Cancer Staging  No matching staging information was found for the patient.         Presentation at Cancer Conference: Prospective     Recommendation: Vaccinate and have surgery perform splenectomy.

## 2019-04-16 ENCOUNTER — TELEPHONE (OUTPATIENT)
Dept: RADIOLOGY | Facility: HOSPITAL | Age: 29
End: 2019-04-16

## 2019-04-16 DIAGNOSIS — D73.89 SPLENIC LESION: Primary | ICD-10-CM

## 2019-04-17 ENCOUNTER — PATIENT MESSAGE (OUTPATIENT)
Dept: HEMATOLOGY/ONCOLOGY | Facility: CLINIC | Age: 29
End: 2019-04-17

## 2019-04-17 ENCOUNTER — TELEPHONE (OUTPATIENT)
Dept: HEMATOLOGY/ONCOLOGY | Facility: CLINIC | Age: 29
End: 2019-04-17

## 2019-04-17 ENCOUNTER — HOSPITAL ENCOUNTER (OUTPATIENT)
Dept: RADIOLOGY | Facility: HOSPITAL | Age: 29
Discharge: HOME | End: 2019-04-17
Attending: INTERNAL MEDICINE
Payer: COMMERCIAL

## 2019-04-17 ENCOUNTER — DOCUMENTATION ONLY (OUTPATIENT)
Dept: HEMATOLOGY/ONCOLOGY | Facility: CLINIC | Age: 29
End: 2019-04-17

## 2019-04-17 DIAGNOSIS — Z00.00 HEALTH CARE MAINTENANCE: ICD-10-CM

## 2019-04-17 DIAGNOSIS — D73.89 SPLENIC LESION: ICD-10-CM

## 2019-04-17 NOTE — PROGRESS NOTES
There has been a change in the plan for this particular patient.  After I had discussed the case with surgery and Interventional radiology, the case was presented at our tumorr Board, where several other physicians were present besides the one I had discussed the case before.  The Tumor Board recommendations is to have the patient go directly to surgery.  I have called the patient and informed her. Therefore, she will not have a biopsy of the splenic lesions.  Dr Lemons notified

## 2019-04-17 NOTE — TELEPHONE ENCOUNTER
----- Message from Sabino Jackson MD sent at 4/17/2019 12:31 PM CDT -----  Please schedule her to see Dr Nicole in surgery ( splenic lesions for splenectomy), and then call patient with information about the appointment  Dr Jackson

## 2019-04-22 ENCOUNTER — DOCUMENTATION ONLY (OUTPATIENT)
Dept: RHEUMATOLOGY | Facility: CLINIC | Age: 29
End: 2019-04-22

## 2019-04-22 ENCOUNTER — TELEPHONE (OUTPATIENT)
Dept: RHEUMATOLOGY | Facility: CLINIC | Age: 29
End: 2019-04-22

## 2019-04-22 NOTE — TELEPHONE ENCOUNTER
Nurse retried to call pt today via home and cell phone, a message was sent via pt portal and it was reported to the system as being not read by patient. Nurse is trying to inform MS Urrutia about her consult with General Surgery  per Dr Jackson. Dr Jackson has been notified of the several attempts and forms of contact.

## 2019-05-23 ENCOUNTER — TELEPHONE (OUTPATIENT)
Dept: HEMATOLOGY/ONCOLOGY | Facility: CLINIC | Age: 29
End: 2019-05-23

## 2019-05-23 NOTE — TELEPHONE ENCOUNTER
Called and spoke with pt regarding her cancelling her appts to see Dr. Nicole and Dr. Jackson.  Pt stated that she cancelled because she wanted to have a second opinion prior to just having her spleen removed.  Explained to pt that the appt is just a consult to discuss his recommendations.  Pt stated that she is still trying to get an appt with a provider for a second opinion.  Pt would not give me the providers name to try and help get appt.  Did explain to pt that the provider would need a copy of her imaging on disc to review.  Explained to pt that she will need to get this from the [facility] radiology dept.  Continued to explain to pt the need for appt due to masses on the spleen.  Pt scheduled appt with me to see Dr. Nicole on 5/27 to discuss possible surgery.

## 2019-05-23 NOTE — TELEPHONE ENCOUNTER
----- Message from Sabino Jackson MD sent at 5/22/2019  7:42 AM CDT -----  This is the lady I talked to you about that did not go see dr Nicole for a consult for probable splenectomy.  She has a very abnormal CT showing splenic masses.  Please give her a call and ask her what is it that she want us to do.  Offer her another aoppointment with me  If you need to talk to me about the case again, [facility] will [facility] today

## 2019-06-10 ENCOUNTER — OFFICE VISIT (OUTPATIENT)
Dept: SURGERY | Facility: CLINIC | Age: 29
End: 2019-06-10
Payer: COMMERCIAL

## 2019-06-10 VITALS
DIASTOLIC BLOOD PRESSURE: 60 MMHG | BODY MASS INDEX: 32.72 KG/M2 | HEART RATE: 88 BPM | SYSTOLIC BLOOD PRESSURE: 97 MMHG | WEIGHT: 167.56 LBS | TEMPERATURE: 98 F

## 2019-06-10 DIAGNOSIS — R16.1 SPLENOMEGALY: Primary | ICD-10-CM

## 2019-06-10 DIAGNOSIS — D73.89 SPLENIC LESION: ICD-10-CM

## 2019-06-10 PROCEDURE — 99999 PR PBB SHADOW E&M-EST. PATIENT-LVL III: CPT | Mod: PBBFAC,,, | Performed by: SURGERY

## 2019-06-10 PROCEDURE — 99244 PR OFFICE CONSULTATION,LEVEL IV: ICD-10-PCS | Mod: S$GLB,,, | Performed by: SURGERY

## 2019-06-10 PROCEDURE — 99244 OFF/OP CNSLTJ NEW/EST MOD 40: CPT | Mod: S$GLB,,, | Performed by: SURGERY

## 2019-06-10 PROCEDURE — 99999 PR PBB SHADOW E&M-EST. PATIENT-LVL III: ICD-10-PCS | Mod: PBBFAC,,, | Performed by: SURGERY

## 2019-06-10 NOTE — LETTER
June 11, 2019                   [facility] - General Surgery  Surgery  [address] [place] [address]  Phone: [phone]  Fax: [phone]   June 11, 2019     Patient: Ghada Oreilly   YOB: 1990   Date of Visit: 6/10/2019       To Whom it May Concern:    Ghada Oreilly was seen in my clinic on 6/10/2019. She may return to work on 06/11/2019.    Please excuse her from any classes or work missed.    If you have any questions or concerns, please don't hesitate to call.    Sincerely,         Cortney Daly MA

## 2019-06-10 NOTE — PROGRESS NOTES
Patient ID: Ghada Oreilly is a 29 y.o. female.    Splenectomy for splenic lesions    Chief Complaint: Consult      HPI:  Patient presented to the emergency room in November of 2018 with right-sided pain. She underwent a non contrasted CT scan at that time.  She was found to have multiple splenic lesions.  See subsequently underwent a CT scan with IV contrast and an ultrasound that confirmed the splenic lesions.    The initial thoughts were to perform a splenic biopsy under image guidance.    The patient was presented at tumor board and the consensus of the group was that a splenectomy be performed for diagnostic purposes.  The patient states she has been vaccinated for Haemophilus influenzae, Neisseria is managing her these, and Streptococcus pneumonia      The patient is concerned about having her spleen removed and plans to seek a 2nd opinion through an oncologist at [facility]      She is no longer having right-sided abdominal pain. She has had 1 or 2 episodes of left-sided abdominal pain.  She does not report any fever chills or weight loss      Review of Systems   Constitutional: Negative for appetite change, chills, fatigue, fever and unexpected weight change.   HENT: Negative for hearing loss and rhinorrhea.    Eyes: Negative for visual disturbance.   Respiratory: Negative for apnea, cough, shortness of breath and wheezing.    Cardiovascular: Negative for chest pain and palpitations.   Gastrointestinal: Positive for abdominal pain (Occasional left abdominal or left flank). Negative for abdominal distention, blood in stool, constipation, diarrhea, nausea and vomiting.   Genitourinary: Negative for dysuria, frequency and urgency.   Musculoskeletal: Negative for arthralgias and neck pain.   Skin: Negative for rash.   Neurological: Negative for seizures, weakness, numbness and headaches.   Hematological: Negative for adenopathy. Does not bruise/bleed easily.   Psychiatric/Behavioral: Negative for  hallucinations. The patient is not nervous/anxious.        No current outpatient medications on file.     No current facility-administered medications for this visit.        Review of patient's allergies indicates:  No Known Allergies    Past Medical History:   Diagnosis Date    Allergy        Past Surgical History:   Procedure Laterality Date    ANKLE SURGERY Right        Family History   Problem Relation Age of Onset    Hypertension Mother     Diabetes Mother     Thyroid disease Mother     No Known Problems Father        Social History     Socioeconomic History    Marital status: Single     Spouse name: Not on file    Number of children: Not on file    Years of education: Not on file    Highest education level: Not on file   Occupational History    Not on file   Social Needs    Financial resource strain: Not on file    Food insecurity:     Worry: Not on file     Inability: Not on file    Transportation needs:     Medical: Not on file     Non-medical: Not on file   Tobacco Use    Smoking status: Never Smoker    Smokeless tobacco: Never Used   Substance and Sexual Activity    Alcohol use: Yes     Frequency: Monthly or less    Drug use: No    Sexual activity: Yes   Lifestyle    Physical activity:     Days per week: Not on file     Minutes per session: Not on file    Stress: Not on file   Relationships    Social connections:     Talks on phone: Not on file     Gets together: Not on file     Attends Gnosticism service: Not on file     Active member of club or organization: Not on file     Attends meetings of clubs or organizations: Not on file     Relationship status: Not on file   Other Topics Concern    Not on file   Social History Narrative    Not on file       Vitals:    06/10/19 1520   BP: 97/60   Pulse: 88   Temp: 97.6 °F (36.4 °C)       Physical Exam   Constitutional: She is oriented to person, place, and time. No distress.   Overweight   HENT:   Head: Normocephalic and atraumatic.   Eyes:  No scleral icterus.   Neck: Normal range of motion. Neck supple.   Cardiovascular: Normal rate, regular rhythm and normal heart sounds.   Pulmonary/Chest: Effort normal and breath sounds normal.   Abdominal: Soft. Bowel sounds are normal. She exhibits no distension and no mass. There is no tenderness.   Musculoskeletal: Normal range of motion.   Neurological: She is alert and oriented to person, place, and time.   Skin: Skin is warm. Capillary refill takes less than 2 seconds.   Psychiatric: She has a normal mood and affect. Her behavior is normal. Judgment and thought content normal.   Vitals reviewed.    CT scan with and without contrast as well as the ultrasound were reviewed.  Tumor board notes were reviewed    Assessment & Plan:    Ghada was seen today for consult.    Diagnoses and all orders for this visit:    Splenomegaly    Splenic lesion      With regards to the above high recommended a splenectomy through a robotic assisted approach.  The rationale for splenectomy, pathologic analysis of the lesions in the spleen, was discussed with the patient.  The risks, benefits and complications of the procedure was discussed.  This includes infection, bleeding, injury to the stomach, abscess, and the need to create an open incision and subsequent hernia.    The risk of blood transfusion, hepatitis, HIV, and transfusion reactions were discussed.    The patient does not desire to pursue splenectomy at this time.  She plans to seek a 2nd opinion from an oncologist at [facility].    I recommended that she seeks or 2nd opinion and contact our office if she desires to proceed with splenectomy through the surgery department at [facility]    If the patient does not undergo splenectomy I would recommend repeat imaging studies, CT of the abdomen with IV contrast in August of 2019 as this would be 6 months from her last CT of the spleen with contrast which was in February of 2019

## 2019-06-10 NOTE — LETTER
Cecy 10, 2019      Sabino Jackson MD  [address]  [place] [address]           [facility] Surgery  [address] [place] [address]  Phone: [phone]  Fax: [phone]          Patient: Ghada Oreilly   MR Number: [id number]   YOB: 1990   Date of Visit: 6/10/2019       Dear Dr. Sabino Jackson:    Thank you for referring Ghada Oreilly to me for evaluation. Attached you will find relevant portions of my assessment and plan of care.    If you have questions, please do not hesitate to call me. I look forward to following Ghada Oreilly along with you.    Sincerely,    Kaz Nicole MD    Enclosure  CC:  No Recipients    If you would like to receive this communication electronically, please contact [email or web address] or [phone] to request more information on [employer] Link access.    For providers and/or their staff who would like to refer a patient to [facility], please contact us through our one-stop-shop provider referral line, [facility], at [phone].    If you feel you have received this communication in error or would no longer like to receive these types of communications, please e-mail [email or web address]

## 2019-06-10 NOTE — PATIENT INSTRUCTIONS
It is the medical of opinion of our tumor board, a group of physicians including surgeons, oncologist, radiation oncologist, pathologist, and radiologist that you have your spleen removed to further evaluate the lesion that were seen on the imaging studies.    The risks of a splenectomy in general are infection, bleeding, abscess, and the need for blood transfusion.    People are vaccinated to reduce the risk of overwhelming post splenectomy sepsis, however this is fairly rare      I recommend you obtain a 2nd opinion from a medical oncologist with regards to the findings in your spleen and the need for operative intervention.    If you do not have your spleen removed I would recommend you arrange for a repeat CT scan of your abdomen with IV contrast in August    Please feel free to schedule an appointment with us here in surgery if you desire to pursue splenectomy or so removal of your spleen

## 2019-06-11 ENCOUNTER — TELEPHONE (OUTPATIENT)
Dept: SURGERY | Facility: CLINIC | Age: 29
End: 2019-06-11

## 2019-06-11 NOTE — TELEPHONE ENCOUNTER
Spoke to patient she is aware that her excuse will be faxed to the number provided, patient voiced understanding.

## 2019-06-11 NOTE — TELEPHONE ENCOUNTER
----- Message from Vahe Palomares sent at 6/11/2019  8:56 AM CDT -----  Contact: Pt  Please give pt a call at .[phone](home)  she is requesting a doctor's note and needs it faxed over to [phone]

## 2019-09-04 NOTE — ED NOTES
Performed in and out catheter using sterile technique to obtain a urine sample. Pt tolerated well.     Pt. BIB EMS from [facility] for admission for decreased function. Pt. had initial intake appointment today, collateral revealed patient has been decompensating and demonstrating depressive symptoms over the past 2 months. Legals signed at clinic and patient brought to ED in order to facilitate transfer. Pt. is begrudingly cooperative in triage.

## 2019-12-05 ENCOUNTER — PATIENT OUTREACH (OUTPATIENT)
Dept: ADMINISTRATIVE | Facility: HOSPITAL | Age: 29
End: 2019-12-05

## 2020-01-09 ENCOUNTER — PATIENT OUTREACH (OUTPATIENT)
Dept: ADMINISTRATIVE | Facility: HOSPITAL | Age: 30
End: 2020-01-09

## 2020-01-15 ENCOUNTER — PATIENT OUTREACH (OUTPATIENT)
Dept: ADMINISTRATIVE | Facility: HOSPITAL | Age: 30
End: 2020-01-15

## 2021-07-27 ENCOUNTER — IMMUNIZATION (OUTPATIENT)
Dept: INTERNAL MEDICINE | Facility: CLINIC | Age: 31
End: 2021-07-27
Payer: COMMERCIAL

## 2021-07-27 DIAGNOSIS — Z23 NEED FOR VACCINATION: Primary | ICD-10-CM

## 2021-07-27 PROCEDURE — 91300 COVID-19, MRNA, LNP-S, PF, 30 MCG/0.3 ML DOSE VACCINE: CPT | Mod: PBBFAC | Performed by: FAMILY MEDICINE

## 2021-08-18 ENCOUNTER — IMMUNIZATION (OUTPATIENT)
Dept: PRIMARY CARE CLINIC | Facility: CLINIC | Age: 31
End: 2021-08-18
Payer: COMMERCIAL

## 2021-08-18 DIAGNOSIS — Z23 NEED FOR VACCINATION: Primary | ICD-10-CM

## 2021-08-18 PROCEDURE — 0002A COVID-19, MRNA, LNP-S, PF, 30 MCG/0.3 ML DOSE VACCINE: CPT | Mod: CV19,S$GLB,, | Performed by: FAMILY MEDICINE

## 2021-08-18 PROCEDURE — 91300 COVID-19, MRNA, LNP-S, PF, 30 MCG/0.3 ML DOSE VACCINE: ICD-10-PCS | Mod: S$GLB,,, | Performed by: FAMILY MEDICINE

## 2021-08-18 PROCEDURE — 0002A COVID-19, MRNA, LNP-S, PF, 30 MCG/0.3 ML DOSE VACCINE: ICD-10-PCS | Mod: CV19,S$GLB,, | Performed by: FAMILY MEDICINE

## 2021-08-18 PROCEDURE — 91300 COVID-19, MRNA, LNP-S, PF, 30 MCG/0.3 ML DOSE VACCINE: CPT | Mod: S$GLB,,, | Performed by: FAMILY MEDICINE

## 2021-12-16 NOTE — ED PROVIDER NOTES
Encounter Date: 11/26/2018       History     Chief Complaint   Patient presents with    Flank Pain     right side     The history is provided by the patient.   Abdominal Pain   The current episode started yesterday (pt states it feels similar to last time  pt had uti (about one year ago)). The onset of the illness was gradual. The problem has been gradually worsening. The abdominal pain is located in the right flank. The abdominal pain does not radiate. Pain scale: mild. The abdominal pain is relieved by certain positions (sitting up). The abdominal pain is exacerbated by certain positions (lying flat). The other symptoms of the illness include nausea. The other symptoms of the illness do not include fever, fatigue, jaundice, melena, shortness of breath, vomiting, diarrhea, dysuria, hematemesis, hematochezia, vaginal discharge or vaginal bleeding.   Nausea began today.   The patient states that she believes she is currently not pregnant. The patient has not had a change in bowel habit. Additional symptoms associated with the illness include urgency, frequency and back pain (right flank pain). Symptoms associated with the illness do not include chills, anorexia, diaphoresis, heartburn, constipation or hematuria. Significant associated medical issues do not include PUD, GERD, inflammatory bowel disease, diabetes, sickle cell disease, gallstones, liver disease, substance abuse, diverticulitis, HIV or cardiac disease.     Review of patient's allergies indicates:  No Known Allergies  Past Medical History:   Diagnosis Date    Allergy      Past Surgical History:   Procedure Laterality Date    ANKLE SURGERY Right      Family History   Problem Relation Age of Onset    Hypertension Mother     Diabetes Mother     Thyroid disease Mother     No Known Problems Father      Social History     Tobacco Use    Smoking status: Never Smoker    Smokeless tobacco: Never Used   Substance Use Topics    Alcohol use: Yes    Drug  use: No     Review of Systems   Constitutional: Negative for chills, diaphoresis, fatigue and fever.   HENT: Negative for sore throat.    Respiratory: Negative for shortness of breath.    Cardiovascular: Negative for chest pain.   Gastrointestinal: Positive for abdominal pain and nausea. Negative for anorexia, constipation, diarrhea, heartburn, hematemesis, hematochezia, jaundice, melena and vomiting.   Genitourinary: Positive for frequency and urgency. Negative for dysuria, hematuria, vaginal bleeding and vaginal discharge.   Musculoskeletal: Positive for back pain (right flank pain).   Skin: Negative for rash.   Neurological: Negative for weakness.   Hematological: Does not bruise/bleed easily.   All other systems reviewed and are negative.      Physical Exam     Initial Vitals [11/26/18 0008]   BP Pulse Resp Temp SpO2   119/70 103 17 98.5 °F (36.9 °C) 100 %      MAP       --         Physical Exam    Nursing note and vitals reviewed.  Constitutional: She appears well-developed and well-nourished.   HENT:   Head: Normocephalic and atraumatic.   Mouth/Throat: No oropharyngeal exudate.   Eyes: Conjunctivae and EOM are normal. Pupils are equal, round, and reactive to light.   Neck: Normal range of motion. Neck supple. No thyromegaly present.   Cardiovascular: Normal rate, regular rhythm, normal heart sounds and intact distal pulses. Exam reveals no gallop and no friction rub.    No murmur heard.  Pulmonary/Chest: Breath sounds normal. No respiratory distress. She has no wheezes. She has no rhonchi. She exhibits no tenderness.   Abdominal: Soft. Bowel sounds are normal. She exhibits no distension. There is no tenderness. There is CVA tenderness (right ttp). There is no rigidity, no rebound, no guarding, no tenderness at McBurney's point and negative Moore's sign. No hernia.       Musculoskeletal: Normal range of motion. She exhibits no edema or tenderness.   Lymphadenopathy:     She has no cervical adenopathy.    Neurological: She is alert and oriented to person, place, and time. She has normal strength. No cranial nerve deficit or sensory deficit.   Skin: Skin is warm and dry. No rash noted.   Psychiatric: She has a normal mood and affect. Her behavior is normal. Judgment and thought content normal.         ED Course   Procedures  Labs Reviewed   CBC W/ AUTO DIFFERENTIAL - Abnormal; Notable for the following components:       Result Value    MCHC 31.9 (*)     Mono% 3.9 (*)     All other components within normal limits   COMPREHENSIVE METABOLIC PANEL - Abnormal; Notable for the following components:    ALT 6 (*)     All other components within normal limits   URINALYSIS, REFLEX TO URINE CULTURE - Abnormal; Notable for the following components:    Urobilinogen, UA 2.0-3.0 (*)     All other components within normal limits    Narrative:     Preferred Collection Type->Urine, Clean Catch   LIPASE   PREGNANCY TEST, URINE RAPID          Imaging Results          CT Renal Stone Study ABD Pelvis WO (Final result)  Result time 11/26/18 07:36:50    Final result by LAURENCE Scales Sr., MD (11/26/18 07:36:50)                 Impression:      1. There are multiple hypodense lesions scattered throughout the spleen.  I recommend consideration of a CT examination of the abdomen and pelvis with oral and IV contrast.  2. The ovaries are not well seen.  If additional imaging evaluation is clinically indicated, I recommend consideration of an ultrasound examination of the pelvis.  3. The emergency room department is already aware of the above findings and impressions at the time of this dictation.  They were notified by Dr. Souza at 01:24 on 11/26/2018.  All CT scans at this facility use dose modulation, iterative reconstruction, and/or weight base dosing when appropriate to reduce radiation dose when appropriate to reduce radiation dose to as low as reasonably achievable.      Electronically signed by: Mendel Scales  MD  Date:    11/26/2018  Time:    07:36             Narrative:    EXAMINATION:  CT RENAL STONE STUDY ABD PELVIS WO    CLINICAL HISTORY:  Unspecified abdominal painright flank pain;    TECHNIQUE:  Standard abdomen and pelvis CT protocol without oral or IV contrast was performed.    FINDINGS:  Finding: The size of the heart is within normal limits. The lungs are clear. There is no pneumothorax or pleural effusion.    There are multiple hypodense lesions scattered throughout the spleen.  The liver, gallbladder, pancreas, adrenals, and kidneys are normal in appearance. The ureters and the urinary bladder are normal in appearance.  AT type intrauterine device is in place.  The ovaries are not well seen.  The appendix and the rest of the gastrointestinal system are normal in appearance. There is no free fluid within the abdomen or pelvis. There is no pneumoperitoneum.                                    Vitals:    11/26/18 0008 11/26/18 0150   BP: 119/70 107/62   Pulse: 103 71   Resp: 17 20   Temp: 98.5 °F (36.9 °C) 98.6 °F (37 °C)   TempSrc: Oral Oral   SpO2: 100% 99%   Weight: 83 kg (182 lb 15.7 oz)        Results for orders placed or performed during the hospital encounter of 11/26/18   CBC W/ AUTO DIFFERENTIAL   Result Value Ref Range    WBC 9.70 3.90 - 12.70 K/uL    RBC 4.50 4.00 - 5.40 M/uL    Hemoglobin 12.5 12.0 - 16.0 g/dL    Hematocrit 39.2 37.0 - 48.5 %    MCV 87 82 - 98 fL    MCH 27.8 27.0 - 31.0 pg    MCHC 31.9 (L) 32.0 - 36.0 g/dL    RDW 14.1 11.5 - 14.5 %    Platelets 179 150 - 350 K/uL    MPV 12.1 9.2 - 12.9 fL    Gran # (ANC) 7.0 1.8 - 7.7 K/uL    Lymph # 2.3 1.0 - 4.8 K/uL    Mono # 0.4 0.3 - 1.0 K/uL    Eos # 0.0 0.0 - 0.5 K/uL    Baso # 0.02 0.00 - 0.20 K/uL    Gran% 72.2 38.0 - 73.0 %    Lymph% 23.2 18.0 - 48.0 %    Mono% 3.9 (L) 4.0 - 15.0 %    Eosinophil% 0.3 0.0 - 8.0 %    Basophil% 0.2 0.0 - 1.9 %    Differential Method Automated    Comp. Metabolic Panel   Result Value Ref Range    Sodium 139 136 -  145 mmol/L    Potassium 4.0 3.5 - 5.1 mmol/L    Chloride 107 95 - 110 mmol/L    CO2 23 23 - 29 mmol/L    Glucose 98 70 - 110 mg/dL    BUN, Bld 14 6 - 20 mg/dL    Creatinine 0.9 0.5 - 1.4 mg/dL    Calcium 9.8 8.7 - 10.5 mg/dL    Total Protein 7.8 6.0 - 8.4 g/dL    Albumin 4.3 3.5 - 5.2 g/dL    Total Bilirubin 0.4 0.1 - 1.0 mg/dL    Alkaline Phosphatase 59 55 - 135 U/L    AST 16 10 - 40 U/L    ALT 6 (L) 10 - 44 U/L    Anion Gap 9 8 - 16 mmol/L    eGFR if African American >60.0 >60 mL/min/1.73 m^2    eGFR if non African American >60.0 >60 mL/min/1.73 m^2   Lipase   Result Value Ref Range    Lipase 22 4 - 60 U/L   Urinalysis, Reflex to Urine Culture Urine, Clean Catch   Result Value Ref Range    Specimen UA Urine, Clean Catch     Color, UA Samantha Yellow, Straw, Samantha    Appearance, UA Clear Clear    pH, UA 6.0 5.0 - 8.0    Specific Gravity, UA 1.025 1.005 - 1.030    Protein, UA Negative Negative    Glucose, UA Negative Negative    Ketones, UA Negative Negative    Bilirubin (UA) Negative Negative    Occult Blood UA Negative Negative    Nitrite, UA Negative Negative    Urobilinogen, UA 2.0-3.0 (A) <2.0 EU/dL    Leukocytes, UA Negative Negative   Pregnancy, urine rapid   Result Value Ref Range    Preg Test, Ur Negative          Imaging Results          CT Renal Stone Study ABD Pelvis WO (Final result)  Result time 11/26/18 07:36:50    Final result by LAURENCE Scales Sr., MD (11/26/18 07:36:50)                 Impression:      1. There are multiple hypodense lesions scattered throughout the spleen.  I recommend consideration of a CT examination of the abdomen and pelvis with oral and IV contrast.  2. The ovaries are not well seen.  If additional imaging evaluation is clinically indicated, I recommend consideration of an ultrasound examination of the pelvis.  3. The emergency room department is already aware of the above findings and impressions at the time of this dictation.  They were notified by Dr. Souza at 01:24 on  11/26/2018.  All CT scans at this facility use dose modulation, iterative reconstruction, and/or weight base dosing when appropriate to reduce radiation dose when appropriate to reduce radiation dose to as low as reasonably achievable.      Electronically signed by: Mendel Scales MD  Date:    11/26/2018  Time:    07:36             Narrative:    EXAMINATION:  CT RENAL STONE STUDY ABD PELVIS WO    CLINICAL HISTORY:  Unspecified abdominal painright flank pain;    TECHNIQUE:  Standard abdomen and pelvis CT protocol without oral or IV contrast was performed.    FINDINGS:  Finding: The size of the heart is within normal limits. The lungs are clear. There is no pneumothorax or pleural effusion.    There are multiple hypodense lesions scattered throughout the spleen.  The liver, gallbladder, pancreas, adrenals, and kidneys are normal in appearance. The ureters and the urinary bladder are normal in appearance.  AT type intrauterine device is in place.  The ovaries are not well seen.  The appendix and the rest of the gastrointestinal system are normal in appearance. There is no free fluid within the abdomen or pelvis. There is no pneumoperitoneum.                                Medications   sodium chloride 0.9% bolus 1,000 mL (0 mLs Intravenous Stopped 11/26/18 0144)   ketorolac injection 30 mg (30 mg Intravenous Given 11/26/18 0043)   ondansetron injection 4 mg (4 mg Intravenous Given 11/26/18 0042)       1:42 AM - Re-evaluation: The patient is resting comfortably and is in no acute distress. She states that her symptoms have improved after treatment within ER. Discussed test results, shared treatment plan, specific conditions for return, and importance of follow up with patient and family.  Advised to f/u c gen surg/pcp for  Splenomegaly/splenic mass and pcp/ob for ovarian cyst. She understands and agrees with the plan as discussed. Answered  her questions at this time. She has remained hemodynamically stable throughout  the ED course and is appropriate for discharge home.     Regarding ABDOMINAL PAIN, I recommended that the patient: Sip water or other clear fluids; avoid solid food for the first few hours after vomiting or diarrhea; if vomiting, wait 6 hours, and then eat small amounts of mild foods such as rice, applesauce, or crackers; avoid dairy products; avoid citrus, high-fat foods, fried or greasy foods, tomato products, caffeine, alcohol, and carbonated beverages;  avoid aspirin, ibuprofen or other anti-inflammatory medications, and narcotic pain medications unless prescribed.  In regards to prevention, I encouraged patient to:  Avoid fatty or greasy foods; drink plenty of water each day; eat small meals more frequently; exercise regularly; limit foods that produce gas; make sure meals are well-balanced and high in fiber and include plenty of fruits and vegetables.    Ghada Oreilly was given a handout which discussed their disease process, precautions, and instructions for follow-up and therapy.    Follow-up Information     Vishal Grace DO. Schedule an appointment as soon as possible for a visit in 1 week.    Specialty:  Family Medicine  Contact information:  [address] [address] [address]  [phone]             [facility]. Schedule an appointment as soon as possible for a visit in 1 week.    Specialty:  General Surgery  Contact information:  [address] [address] [phone]           [facility].    Specialty:  Emergency Medicine  Why:  As needed, If symptoms worsen  Contact information:  [address] [address] [id number] [phone]                    Medication List      START taking these medications    naproxen 500 MG EC tablet  Commonly known as:  EC NAPROSYN  Take 1 tablet (500 mg total) by mouth 2 (two) times daily with meals. for 10 days        STOP taking these medications    ketorolac 10 mg tablet  Commonly known as:   TORADOL           Where to Get Your Medications      You can get these medications from any pharmacy    Bring a paper prescription for each of these medications  · naproxen 500 MG EC tablet        Current Discharge Medication List            ED Diagnosis  1. Right ovarian cyst    2. Right flank pain    3. Splenomegaly    4. Splenic mass                             Clinical Impression:   The primary encounter diagnosis was Right ovarian cyst. Diagnoses of Right flank pain, Splenomegaly, and Splenic mass were also pertinent to this visit.      Disposition:   Disposition: Discharged  Condition: Stable                        Vin Rick Gomez MD  11/27/18 0424     Length To Time In Minutes Device Was In Place: 10

## 2024-03-21 NOTE — PROGRESS NOTES
Spoke with patient. Patient states she is taking the trospium. Script sent. Advise patient to keep follow up for 4/30. Patient verbalized understanding. No further questions.    Subjective:       Patient ID: Ghada Oreilly is a 27 y.o. female.    Chief Complaint: Rash (Irritation to bilateral armpits x 2-3 days. Pt states she changed deodorants but unsure if related) and Itching (C/o inccreased itching of skin at night. Ran out of Claritin. )    HPI  Here today complaining of itching to the both of her armpits.  She noticed that it started a couple weeks after using a new deodorant.  She had used degree.  Normally she uses Dove and she has since returned to using that deodorant.  Fortunately, she has had improvement with the symptoms of itching but still continues to have some intermittently.  She has not tried any other topical relief.    Family History   Problem Relation Age of Onset    Hypertension Mother     Diabetes Mother     Thyroid disease Mother     No Known Problems Father        Current Outpatient Prescriptions:     loratadine (CLARITIN) 10 mg tablet, TAKE 1 TABLET ORAL QD (EVERY DAY) FOR 14 DAYS, Disp: , Rfl: 0    Review of Systems   Constitutional: Negative for chills and fever.   Respiratory: Negative for chest tightness and shortness of breath.    Cardiovascular: Negative for chest pain.   Skin: Positive for rash.       Objective:   /72 (BP Location: Left arm, Patient Position: Sitting, BP Method: Manual)   Pulse 80   Temp 97.5 °F (36.4 °C) (Tympanic)   Resp 18   Ht 5' (1.524 m)   Wt 83.8 kg (184 lb 11.9 oz)   LMP 05/15/2017   SpO2 98%   BMI 36.08 kg/m²      Physical Exam   Constitutional: She is oriented to person, place, and time. She appears well-developed and well-nourished.   HENT:   Head: Normocephalic and atraumatic.   Eyes: Conjunctivae are normal.   Cardiovascular: Normal rate.    Pulmonary/Chest: Effort normal. No respiratory distress.   Musculoskeletal: She exhibits no edema.   Neurological: She is alert and oriented to person, place, and time. Coordination normal.   Skin: Skin is warm and dry. Rash (raised skin in b/l axilla. no erythema. no  induration) noted.   Psychiatric: She has a normal mood and affect. Her behavior is normal.   Vitals reviewed.      Assessment & Plan     1. Allergic contact dermatitis due to cosmetics  Recommended avoiding the trigger deodorant.  Continue to use Dove and if possible uses no deodorant on some days to help speed up the improvement.  Also told her that she could use some hydrocortisone cream at nighttime to help speed up recovery.  Recommended use of Zyrtec for itching    2. Obesity (BMI 30.0-34.9)  Counseled on importance on diet and exercise to decrease risk of comorbid conditions and for improved quality of life.  - Lipid panel    3. Screen for STD (sexually transmitted disease)  - HIV-1 and HIV-2 antibodies  - RPR